# Patient Record
Sex: MALE | Race: WHITE | NOT HISPANIC OR LATINO | Employment: OTHER | URBAN - METROPOLITAN AREA
[De-identification: names, ages, dates, MRNs, and addresses within clinical notes are randomized per-mention and may not be internally consistent; named-entity substitution may affect disease eponyms.]

---

## 2021-04-08 DIAGNOSIS — Z23 ENCOUNTER FOR IMMUNIZATION: ICD-10-CM

## 2023-03-06 ENCOUNTER — OFFICE VISIT (OUTPATIENT)
Dept: DERMATOLOGY | Facility: CLINIC | Age: 78
End: 2023-03-06

## 2023-03-06 VITALS — HEIGHT: 69 IN | WEIGHT: 214 LBS | BODY MASS INDEX: 31.7 KG/M2

## 2023-03-06 DIAGNOSIS — L30.9 DERMATITIS: ICD-10-CM

## 2023-03-06 DIAGNOSIS — D48.5 NEOPLASM OF UNCERTAIN BEHAVIOR OF SKIN: Primary | ICD-10-CM

## 2023-03-06 RX ORDER — PRAVASTATIN SODIUM 40 MG
40 TABLET ORAL DAILY
COMMUNITY

## 2023-03-06 RX ORDER — HYDROCHLOROTHIAZIDE 25 MG/1
25 TABLET ORAL DAILY
COMMUNITY
Start: 2023-02-28

## 2023-03-06 RX ORDER — AMLODIPINE, VALSARTAN AND HYDROCHLOROTHIAZIDE 5; 160; 12.5 MG/1; MG/1; MG/1
12.5 TABLET ORAL DAILY
COMMUNITY

## 2023-03-06 RX ORDER — BETAMETHASONE DIPROPIONATE 0.5 MG/G
1 CREAM TOPICAL 2 TIMES DAILY
COMMUNITY

## 2023-03-06 RX ORDER — TRIAMCINOLONE ACETONIDE 1 MG/G
1 CREAM TOPICAL 2 TIMES DAILY
COMMUNITY
Start: 2023-02-27

## 2023-03-06 RX ORDER — MONTELUKAST SODIUM 10 MG/1
10 TABLET ORAL DAILY
COMMUNITY

## 2023-03-06 RX ORDER — CLOBETASOL PROPIONATE 0.5 MG/G
CREAM TOPICAL
Qty: 60 G | Refills: 3 | Status: SHIPPED | OUTPATIENT
Start: 2023-03-06

## 2023-03-06 NOTE — PATIENT INSTRUCTIONS
INFORMED CONSENT DISCUSSION AND POST-OPERATIVE INSTRUCTIONS FOR PATIENT    I   RATIONALE FOR PROCEDURE  I understand that a skin biopsy allows the Dermatologist to test a lesion or rash under the microscope to obtain a diagnosis  It usually involves numbing the area with numbing medication and removing a small piece of skin; sometimes the area will be closed with sutures  In this specific procedure, sutures are not usually needed  If any sutures are placed, then they are usually need to be removed in 2 weeks or less  I understand that my Dermatologist recommends that a skin "shave" biopsy be performed today  A local anesthetic, similar to the kind that a dentist uses when filling a cavity, will be injected with a very small needle into the skin area to be sampled  The injected skin and tissue underneath "will go to sleep” and become numb so no pain should be felt afterwards  An instrument shaped like a tiny "razor blade" (shave biopsy instrument) will be used to cut a small piece of tissue and skin from the area so that a sample of tissue can be taken and examined more closely under the microscope  A slight amount of bleeding will occur, but it will be stopped with direct pressure and a pressure bandage and any other appropriate methods  I understands that a scar will form where the wound was created  Surgical ointment will be applied to help protect the wound  Sutures are not usually needed      II   RISKS AND POTENTIAL COMPLICATIONS   I understand the risks and potential complications of a skin biopsy include but are not limited to the following:  Bleeding  Infection  Pain  Scar/keloid  Skin discoloration  Incomplete Removal  Recurrence  Nerve Damage/Numbness/Loss of Function  Allergic Reaction to Anesthesia  Biopsies are diagnostic procedures and based on findings additional treatment or evaluation may be required  Loss or destruction of specimen resulting in no additional findings    My Dermatologist has explained to me the nature of the condition, the nature of the procedure, and the benefits to be reasonably expected compared with alternative approaches  My Dermatologist has discussed the likelihood of major risks or complications of this procedure including the specific risks listed above, such as bleeding, infection, and scarring/keloid  I understand that a scar is expected after this procedure  I understand that my physician cannot predict if the scar will form a "keloid," which extends beyond the borders of the wound that is created  A keloid is a thick, painful, and bumpy scar  A keloid can be difficult to treat, as it does not always respond well to therapy, which includes injecting cortisone directly into the keloid every few weeks  While this usually reduces the pain and size of the scar, it does not eliminate it  I understand that photographs may be taken before and after the procedure  These will be maintained as part of the medical providers confidential records and may not be made available to me  I further authorize the medical provider to use the photographs for teaching purposes or to illustrate scientific papers, books, or lectures if in his/her judgment, medical research, education, or science may benefit from its use  I have had an opportunity to fully inquire about the risks and benefits of this procedure and its alternatives  I have been given ample time and opportunity to ask questions and to seek a second opinion if I wished to do so  I acknowledge that there have specifically been no guarantees as to the cosmetic results from the procedure  I am aware that with any procedure there is always the possibility of an unexpected complication  III  POST-PROCEDURAL CARE (WHAT YOU WILL NEED TO DO "AFTER THE BIOPSY" TO OPTIMIZE HEALING)    Keep the area clean and dry  Try NOT to remove the bandage or get it wet for the first 24 hours      Gently clean the area and apply surgical ointment (such as Vaseline petrolatum ointment, which is available "over the counter" and not a prescription) to the biopsy site for up to 2 weeks straight  This acts to protect the wound from the outside world  Sutures are not usually placed in this procedure  If any sutures were placed, return for suture removal as instructed (generally 1 week for the face, 2 weeks for the body)  Take Acetaminophen (Tylenol) for discomfort, if no contraindications  Ibuprofen or aspirin could make bleeding worse  Call our office immediately for signs of infection: fever, chills, increased redness, warmth, tenderness, discomfort/pain, or pus or foul smell coming from the wound  WHAT TO DO IF THERE IS ANY BLEEDING? If a small amount of bleeding is noticed, place a clean cloth over the area and apply firm pressure for ten minutes  Check the wound after 10 minutes of direct pressure  If bleeding persists, try one more time for an additional 10 minutes of direct pressure on the area  If the bleeding becomes heavier or does not stop after the second attempt, or if you have any other questions about this procedure, then please call your [facility] [address]'s Dermatologist by calling [phone] (SKIN)  I hereby acknowledge that I have reviewed and verified the site with my Dermatologist and have requested and authorized my Dermatologist to proceed with the procedure

## 2023-03-06 NOTE — PROGRESS NOTES
[facility] Note     Patient Name: Black Lombardo  Encounter Date: 03/06/2023     Have you been cared for by a [place] [id number] Dermatologist in the last 3 years and, if so, which description applies to you? NO  I am considered a "new" patient and must complete all patient intake questions  I am MALE/not capable of bearing children  REVIEW OF SYSTEMS:  Have you recently had or currently have any of the following? · Recent fever or chills? No  · Any non-healing wound? No   PAST MEDICAL HISTORY:  Have you personally ever had or currently have any of the following? If "YES," then please provide more detail  · Skin cancer (such as Melanoma, Basal Cell Carcinoma, Squamous Cell Carcinoma? No  · Tuberculosis, HIV/AIDS, Hepatitis B or C: No  · Systemic Immunosuppression such as Diabetes, Biologic or Immunotherapy, Chemotherapy, Organ Transplantation, Bone Marrow Transplantation No  · Radiation Treatment No   FAMILY HISTORY:  Any "first degree relatives" (parent, brother, sister, or child) with the following? • Skin Cancer, Pancreatic or Other Cancer? No   PATIENT EXPERIENCE:    • Do you want the Dermatologist to perform a COMPLETE skin exam today including a clinical examination under the "bra and underwear" areas? NO  • If necessary, do we have your permission to call and leave a detailed message on your Preferred Phone number that includes your specific medical information? Yes      Allergies   Allergen Reactions   • Penicillins Anaphylaxis, Hives and Shortness Of Breath      Current Outpatient Medications:   •  amLODIPine-Valsartan-HCTZ 5-160-12 5 MG TABS, Take 12 5 mg by mouth in the morning, Disp: , Rfl:   •  betamethasone dipropionate (DIPROSONE) 0 05 % cream, Apply 1 application   topically 2 (two) times a day, Disp: , Rfl:   •  hydrochlorothiazide (HYDRODIURIL) 25 mg tablet, Take 25 mg by mouth in the morning, Disp: , Rfl:   •  montelukast (SINGULAIR) 10 mg tablet, Take 10 mg by mouth in the morning, Disp: , Rfl:   •  pravastatin (PRAVACHOL) 40 mg tablet, Take 40 mg by mouth in the morning, Disp: , Rfl:   •  triamcinolone (KENALOG) 0 1 % cream, Apply 1 application  topically 2 (two) times a day (Patient not taking: Reported on 3/6/2023), Disp: , Rfl:           • Whom besides the patient is providing clinical information about today's encounter?   o NO ADDITIONAL HISTORIAN (patient alone provided history)    Physical Exam and Assessment/Plan by Diagnosis:    DERMATITIS    Physical Exam:  • (Anatomic Location); (Size and Morphological Description); (Differential Diagnosis):  o Extensive excoriations with erythematous scaly papules and plaques on back, arms, and legs  o Specimen A: Right upper back: 2 cm erythematous scaly plaque with peripheral pigment  Differential diagnosis: pigmented BCC, eczematous dermatitis, drug eruption, lichen planus, other  o Specimen B: Right anterior shin: scaly erythematous plaque Differential diagnosis: pigmented BCC, eczematous dermatitis, drug eruption, lichen planus, other  • Pertinent Positives:  • Pertinent Negatives: Additional History of Present Condition:  Pt has had a rash on different areas of the body for the past 4-5 years  Pt reports itching and scratching  Pt states he has been using Betamethasone dipropionate cream with minimal relief  Assessment and Plan: Unclear etiology with broad differential as above  Back lesion could be BCC, so sampled that as well  Will switch to clobetasol BID until biopsy results return  • I have discussed with the patient that a sample of skin via a "skin biopsy” would be potentially helpful to further make a specific diagnosis under the microscope    • Based on a thorough discussion of this condition and the management approach to it (including a comprehensive discussion of the known risks, side effects and potential benefits of treatment), the patient (family) agrees to implement the following specific plan:    o Procedure:  Skin Biopsy  After a thorough discussion of treatment options and risk/benefits/alternatives (including but not limited to local pain, scarring, dyspigmentation, blistering, possible superinfection, and inability to confirm a diagnosis via histopathology), verbal and written consent were obtained and portion of the rash was biopsied for tissue sample  See below for consent that was obtained from patient and subsequent Procedure Note   o Will send Clobetasol cream through [facility]    PROCEDURE NOTE:  PUNCH BIOPSY      Performing Physician: Tristen Camilo    Anatomic Location; Clinical Description with size (cm); Pre-Op Diagnosis:   • Specimen B: Right anterior shin: scaly erythematous plaque Differential diagnosis: pigmented BCC, eczematous dermatitis, drug eruption, lichen planus, other       Anesthesia: 1% xylocaine with epi       Topical anesthesia: None       Indications: To indicate diagnosis and management plan  Procedure Details     Patient informed of the risks (including bleeding,scaring and infection) and benefits of the procedure explained  Verbal and written informed consent obtained  The area was prepped and draped in the usual fashion  Anesthesia was obtained with 1% lidocaine with epinephrine  The skin was then stretched perpendicular to the skin tension lines and a punch biopsy to an appropriate sampling depth was obtained with a 4 mm punch with a forceps and iris scissors  Hemostasis was obtained with 4-0 Prolene x 1 sutures  Complications:  None      Specimen has been sent for review by Dermatopathology  Plan:  1  Instructed to keep the wound dry and covered for 24-48h and clean thereafter  2  Warning signs of infection were reviewed  3  Recommended that the patient use acetaminophen as needed for pain  4   Sutures if any should be removed in 10 days      Standard post-procedure care has been explained and has been included in written form within the patient's copy of Informed Consent  PROCEDURE TANGENTIAL (SHAVE) BIOPSY NOTE:    • Performing Physician: Ila Tolbert  • Anatomic Location; Clinical Description with size (cm); Pre-Op Diagnosis:   Specimen A: Right upper back: 2 cm erythematous scaly plaque with peripheral pigment  Differential diagnosis: pigmented BCC, eczematous dermatitis, drug eruption, lichen planus, other  • Post-op diagnosis: Same     • Local anesthesia: 1% xylocaine with epi      • Topical anesthesia: None    • Hemostasis: Aluminum chloride       After obtaining informed consent  at which time there was a discussion about the purpose of biopsy  and low risks of infection and bleeding  The area was prepped and draped in the usual fashion  Anesthesia was obtained with 1% lidocaine with epinephrine  A shave biopsy to an appropriate sampling depth was obtained by Shave (Dermablade or 15 blade) The resulting wound was covered with surgical ointment and bandaged appropriately  The patient tolerated the procedure well without complications and was without signs of functional compromise  Specimen has been sent for review by Dermatopathology  Standard post-procedure care has been explained and has been included in written form within the patient's copy of Informed Consent  INFORMED CONSENT DISCUSSION AND POST-OPERATIVE INSTRUCTIONS FOR PATIENT    I   RATIONALE FOR PROCEDURE  I understand that a skin biopsy allows the Dermatologist to test a lesion or rash under the microscope to obtain a diagnosis  It usually involves numbing the area with numbing medication and removing a small piece of skin; sometimes the area will be closed with sutures  In this specific procedure, sutures are not usually needed  If any sutures are placed, then they are usually need to be removed in 2 weeks or less  I understand that my Dermatologist recommends that a skin "shave" biopsy be performed today    A local anesthetic, similar to the kind that a dentist uses when filling a cavity, will be injected with a very small needle into the skin area to be sampled  The injected skin and tissue underneath "will go to sleep” and become numb so no pain should be felt afterwards  An instrument shaped like a tiny "razor blade" (shave biopsy instrument) will be used to cut a small piece of tissue and skin from the area so that a sample of tissue can be taken and examined more closely under the microscope  A slight amount of bleeding will occur, but it will be stopped with direct pressure and a pressure bandage and any other appropriate methods  I understands that a scar will form where the wound was created  Surgical ointment will be applied to help protect the wound  Sutures are not usually needed  II   RISKS AND POTENTIAL COMPLICATIONS   I understand the risks and potential complications of a skin biopsy include but are not limited to the following:  • Bleeding  • Infection  • Pain  • Scar/keloid  • Skin discoloration  • Incomplete Removal  • Recurrence  • Nerve Damage/Numbness/Loss of Function  • Allergic Reaction to Anesthesia  • Biopsies are diagnostic procedures and based on findings additional treatment or evaluation may be required  • Loss or destruction of specimen resulting in no additional findings    My Dermatologist has explained to me the nature of the condition, the nature of the procedure, and the benefits to be reasonably expected compared with alternative approaches  My Dermatologist has discussed the likelihood of major risks or complications of this procedure including the specific risks listed above, such as bleeding, infection, and scarring/keloid  I understand that a scar is expected after this procedure  I understand that my physician cannot predict if the scar will form a "keloid," which extends beyond the borders of the wound that is created  A keloid is a thick, painful, and bumpy scar    A keloid can be difficult to treat, as it does not always respond well to therapy, which includes injecting cortisone directly into the keloid every few weeks  While this usually reduces the pain and size of the scar, it does not eliminate it  I understand that photographs may be taken before and after the procedure  These will be maintained as part of the medical providers confidential records and may not be made available to me  I further authorize the medical provider to use the photographs for teaching purposes or to illustrate scientific papers, books, or lectures if in his/her judgment, medical research, education, or science may benefit from its use  I have had an opportunity to fully inquire about the risks and benefits of this procedure and its alternatives  I have been given ample time and opportunity to ask questions and to seek a second opinion if I wished to do so  I acknowledge that there have specifically been no guarantees as to the cosmetic results from the procedure  I am aware that with any procedure there is always the possibility of an unexpected complication  III  POST-PROCEDURAL CARE (WHAT YOU WILL NEED TO DO "AFTER THE BIOPSY" TO OPTIMIZE HEALING)    • Keep the area clean and dry  Try NOT to remove the bandage or get it wet for the first 24 hours  • Gently clean the area and apply surgical ointment (such as Vaseline petrolatum ointment, which is available "over the counter" and not a prescription) to the biopsy site for up to 2 weeks straight  This acts to protect the wound from the outside world  • Sutures are not usually placed in this procedure  If any sutures were placed, return for suture removal as instructed (generally 1 week for the face, 2 weeks for the body)  • Take Acetaminophen (Tylenol) for discomfort, if no contraindications  Ibuprofen or aspirin could make bleeding worse      • Call our office immediately for signs of infection: fever, chills, increased redness, warmth, tenderness, discomfort/pain, or pus or foul smell coming from the wound  WHAT TO DO IF THERE IS ANY BLEEDING? If a small amount of bleeding is noticed, place a clean cloth over the area and apply firm pressure for ten minutes  Check the wound after 10 minutes of direct pressure  If bleeding persists, try one more time for an additional 10 minutes of direct pressure on the area  If the bleeding becomes heavier or does not stop after the second attempt, or if you have any other questions about this procedure, then please call your [facility] Dermatologist by calling [phone] (SKIN)  I hereby acknowledge that I have reviewed and verified the site with my Dermatologist and have requested and authorized my Dermatologist to proceed with the procedure      Scribe Attestation    I,:  Reyes Lowe am acting as a scribe while in the presence of the attending physician :       I,:  Kapil Mcleod MD personally performed the services described in this documentation    as scribed in my presence :

## 2023-03-16 NOTE — RESULT ENCOUNTER NOTE
Team- please schedule 30 minute excision for the [facility] on the R upper back    DERMATOPATHOLOGY RESULT NOTE    Results reviewed by ordering physician  Called patient to personally discuss results  Discussed results with patient  Instructions for Clinical Derm Team:   (remember to route Result Note to appropriate staff):    Call patient and schedule for excision of site A    Result & Plan by Specimen:    Specimen A: malignant BCC  Plan: excision      Specimen B: hypersensitivity reaction  Plan: Favor drug related (possible his combo amlodipine/HCTZ/valsartan vs HCTZ vs statin)  Advised to see PCP to consider stopping the combo med first for 6-8 weeks to see if this is etiology  Will contact PCP to inform him as well- Dr Rach Russ  Patient to continue topical clobetasol in the interim  Component   Case Report  Surgical Pathology Report                         Case: [id number]                                   Authorizing Provider: Aubree Aguilar MD     Collected:           03/06/2023 [facility]              Ordering Location:     [facility]      Received:            03/06/2023 [address] [place] [address]                                                                 Pathologist:           Aubree Aguilar MD                                                       Specimens:   A) - Skin, Other, Specimen A: Right upper back                                                      B) - Skin, Other, Specimen B: Right anterior shin                                        Final Diagnosis  A  Skin, Right upper back, Shave biopsy:  BASAL CELL CARCINOMA, SUPERFICIAL AND MULTIFOCAL TYPE; EXTENDING TO BIOPSY MARGINS  (See note)     Note: A GMS highlights normal Pityrosporum within a follicle, but is otherwise negative for fungal elements         B  Skin, Right anterior shin, Punch biopsy:  Features most consistent with DERMAL HYPERSENSITIVITY REACTION   (See note)     Note: Sections demonstrate a primarily normal epidermis with a normal basket weave stratum corneum and without significant spongiosis or epidermotropism/exocytosis  In the superficial to mid dermis, prominent perivascular and interstitial inflammation is present with extensive eosinophils, as well as admixed chronic inflammation  Vessel walls appear intact  The eosinophils are present in the interstitium extending to the mid to deep portions of the dermis  A hypersensitivity reaction is favored      A GMS is negative for fungal elements      This case was reviewed in intradepartmental consensus conference with agreement    Electronically signed by Thelma Pavon MD on 3/15/2023 at  1:43 PM   Preliminary result electronically signed by Thelma Pavon MD on 3/13/2023 at  9:45 AM   Preliminary result electronically signed by Thelma Pavon MD on 3/10/2023 at  9:19 AM  Additional Information

## 2023-03-17 ENCOUNTER — TELEPHONE (OUTPATIENT)
Dept: DERMATOLOGY | Facility: CLINIC | Age: 78
End: 2023-03-17

## 2023-03-17 NOTE — TELEPHONE ENCOUNTER
Per Dr Jeimy Cole request      Team- please schedule 30 minute excision for the [facility] on the R upper back     Dr Jeimy Cole first available slot is in June for a procedure    Can this wait until then or should this be scheduled with someone else  If so, with who and when? Pt lives in [facility]  Thank you!     Sent message to Dr Shameka Jackson and CV clinical

## 2023-05-18 ENCOUNTER — PROCEDURE VISIT (OUTPATIENT)
Dept: DERMATOLOGY | Facility: CLINIC | Age: 78
End: 2023-05-18

## 2023-05-18 VITALS — BODY MASS INDEX: 31.25 KG/M2 | TEMPERATURE: 96.8 F | WEIGHT: 211 LBS | HEIGHT: 69 IN

## 2023-05-18 DIAGNOSIS — C44.519 BASAL CELL CARCINOMA (BCC) OF BACK: Primary | ICD-10-CM

## 2023-05-18 RX ORDER — AMLODIPINE BESYLATE 5 MG/1
5 TABLET ORAL DAILY
COMMUNITY
Start: 2023-04-19

## 2023-05-18 NOTE — PROGRESS NOTES
"[facility] Note     Patient Name: Sammi Alberts  Encounter Date: 05/18/2023     Have you been cared for by a [facility] in the last 3 years and, if so, which description applies to you? Yes  I have been here within the last 3 years, and my medical history has NOT changed since that time  I am MALE/not capable of bearing children  REVIEW OF SYSTEMS:  Have you recently had or currently have any of the following? · No changes in my recent health  PAST MEDICAL HISTORY:  Have you personally ever had or currently have any of the following? If \"YES,\" then please provide more detail  · No changes in my medical history  FAMILY HISTORY:  Any \"first degree relatives\" (parent, brother, sister, or child) with the following? • No changes in my family's known health  PATIENT EXPERIENCE:    • Do you want the Dermatologist to perform a COMPLETE skin exam today including a clinical examination under the \"bra and underwear\" areas? NO  • If necessary, do we have your permission to call and leave a detailed message on your Preferred Phone number that includes your specific medical information? Yes      Allergies   Allergen Reactions   • Penicillins Anaphylaxis, Hives and Shortness Of Breath      Current Outpatient Medications:   •  amLODIPine (NORVASC) 5 mg tablet, Take 5 mg by mouth daily, Disp: , Rfl:   •  amLODIPine-Valsartan-HCTZ 5-160-12 5 MG TABS, Take 12 5 mg by mouth in the morning, Disp: , Rfl:   •  betamethasone dipropionate (DIPROSONE) 0 05 % cream, Apply 1 application  topically 2 (two) times a day, Disp: , Rfl:   •  clobetasol (TEMOVATE) 0 05 % cream, Apply BID for up to 2 weeks to affected skin on neck down prn flares then take one week break and can repeat regimen prn flares   Do not apply to groin, skin folds, face , Disp: 60 g, Rfl: 3  •  hydrochlorothiazide (HYDRODIURIL) 25 mg tablet, Take 25 mg by mouth in the morning, Disp: , Rfl:   •  montelukast (SINGULAIR) 10 " mg tablet, Take 10 mg by mouth in the morning, Disp: , Rfl:   •  pravastatin (PRAVACHOL) 40 mg tablet, Take 40 mg by mouth in the morning, Disp: , Rfl:   •  triamcinolone (KENALOG) 0 1 % cream, Apply 1 application  topically 2 (two) times a day (Patient not taking: Reported on 3/6/2023), Disp: , Rfl:           • Whom besides the patient is providing clinical information about today's encounter?   o NO ADDITIONAL HISTORIAN (patient alone provided history)    Physical Exam and Assessment/Plan by Diagnosis:    CURETTAGE AND DESICCATION Malignant Lesion    Diagnosis: Basal celll carcinoma, superficial and multifocal type  • Prior biopsy: Yes  • Access Number: [id number]  • Location Right upper back  • Size preop 2 4 x 1 8 cm  • Size postop 2 9 x 2 5 cm    Component    Case Report   Surgical Pathology Report                         Case: [id number]                                    Authorizing Provider: Shavon Man MD     Collected:           03/06/2023 [facility]               Ordering Location:     [facility]      Received:            03/06/2023 [address] [place] [address]                                                                  Pathologist:           Shavon Man MD                                                        Specimens:   A) - Skin, Other, Specimen A: Right upper back                                                       B) - Skin, Other, Specimen B: Right anterior shin                                          Final Diagnosis   A  Skin, Right upper back, Shave biopsy:  BASAL CELL CARCINOMA, SUPERFICIAL AND MULTIFOCAL TYPE; EXTENDING TO BIOPSY MARGINS  (See note)     Note: A GMS highlights normal Pityrosporum within a follicle, but is otherwise negative for fungal elements             Additional History of Present Condition:  Biopsy done 03/06/2023  Discussed options of ED&C vs excision   Patient requested to proceed with [facility] with signed consent after discussion of slightly lower permanent cure rate and inability to send tissue for margin evaluation vs standard excision  Assessment and Plan:  Based on a thorough discussion of this condition and the management approach to it (including a comprehensive discussion of the known risks, side effects and potential benefits of treatment), the patient (family) agrees to treat the above described lesion with desiccation and curettage  Procedure:  • The area was cleanly  prepped in usual manner  • Anesthesia:1% xylocaine with epi    • The above described lesion was aggressively curetted to mid dermis followed by desiccation  • Number of cycles of desiccation and curettage 3; proceeded until all tissue felt gritty/healthy   • A clean dry dressing was placed on site  Oral and written postop care instructions were discussed and reviewed      DISCUSSION OF TREATMENT AND POSTOP CARE FOR PATIENT    What is curettage and desiccation? Curettage and desiccation is a type of electrosurgery in which a skin lesion is scraped off and heat is applied to the skin surface  What is involved in curettage and cautery? Your doctor will explain to you why your skin lesion needs treatment and the procedure involved  You may have to sign a consent form to indicate that you consent to the surgical procedure  Tell your doctor if you are taking any medication, or if you have any allergies or medical conditions  The doctor will inject some local anaesthetic into the area surrounding the lesion to be treated  This will make the skin go numb so no pain should be felt during the procedure  You may feel a pushing sensation but this should not be painful  The skin lesion is scraped off with a curette, which is like a small spoon with very sharp edges  The lesion should be sent to a pathology laboratory for analysis  The wound surface is then cauterised with a hot wire beaded tip or electrosurgical unit (diathermy)   This stops bleeding and helps destroys any remaining skin tumour cells  This procedure is usually repeated twice for malignant skin lesions (serial curettage and cautery)  A dressing may be applied and instructions should be given on how to care for your wound  What types of skin lesions can be treated by curettage? Curettage is suitable to treat lesions where the material being scraped off is softer than the surrounding skin or when there is a natural cleavage plane between the lesion and the surrounding normal tissue  The following are sometimes treated by curettage:  • Squamous cell carcinoma in situ   • Actinic keratoses   Basal cell carcinomas that are large, deep or recurrent are usually not suitable for curettage  Lesions with poorly defined edges are also generally unsuitable  Curettage and desiccation is most often used in more superficial type basal cell carcinoma  Will I have a scar? Curettage often results in some sort of scar especially if accompanied by cautery  The scars from curettage are usually flat and round  They are a similar size to that of the original skin lesion  Some people have an abnormal response to skin healing and these people may get larger scars than usual (keloids and hypertrophic scarring)  How do I look after the wound following skin curettage? The wound may be tender when the local anaesthetic wears off  • Leave the dressing in place till the nest day  Avoid strenuous exertion and stretching of the area  • If there is any bleeding, press on the wound firmly with a folded towel without looking at it for 30 minutes  If it is still bleeding after this time, seek medical attention  Follow instructions a written in our consent ,  The wound from curettage will take approximately 2-3 weeks to heal over  The scar will initially be red and raised but usually reduces in colour and size over several months            Scribe Attestation    I,:  Madison Peterson am acting as a scribe while in the presence of the attending physician :       I,:  Gian Jang MD personally performed the services described in this documentation    as scribed in my presence :

## 2023-09-28 ENCOUNTER — TELEPHONE (OUTPATIENT)
Age: 78
End: 2023-09-28

## 2023-09-28 NOTE — TELEPHONE ENCOUNTER
Patient calling stating he is having issues with his sx site and is getting worried, nothing available in [place].  Is this something that should be overbooked or just waitlist?

## 2023-09-28 NOTE — TELEPHONE ENCOUNTER
Called patient , no answer , left vm requesting a call back   .  Advised him to send us a picture via my chart

## 2023-10-02 ENCOUNTER — TELEPHONE (OUTPATIENT)
Age: 78
End: 2023-10-02

## 2023-10-02 NOTE — TELEPHONE ENCOUNTER
Good morning Dr. Claude Maza,   Patient states soreness , tenderness and raise in the middle of his sx site ; started about a week ago . Denies  fever , chills or drainage . Advised patient to send us a picture via my chart , he will call us back to teach him how to do it as soon as he has his computer on. I gave him my number and our main number as well.

## 2023-10-03 ENCOUNTER — TELEPHONE (OUTPATIENT)
Age: 78
End: 2023-10-03

## 2023-10-03 NOTE — TELEPHONE ENCOUNTER
Rec'd call from patient  Please schedule for  10/16/2023 @ 11:40 in [facility] with Dr. Haven Krabbe morning! Patient can be added as an overbook at 1140 in [facility] as long as we don't have anyone after 1120. No rush- sometime in the next month is fine. Thanks!

## 2023-10-16 ENCOUNTER — TELEPHONE (OUTPATIENT)
Dept: OTHER | Facility: HOSPITAL | Age: 78
End: 2023-10-16

## 2023-10-16 ENCOUNTER — OFFICE VISIT (OUTPATIENT)
Dept: DERMATOLOGY | Facility: CLINIC | Age: 78
End: 2023-10-16
Payer: COMMERCIAL

## 2023-10-16 VITALS — HEIGHT: 69 IN | BODY MASS INDEX: 29.77 KG/M2 | WEIGHT: 201 LBS

## 2023-10-16 DIAGNOSIS — L91.0 KELOID: Primary | ICD-10-CM

## 2023-10-16 DIAGNOSIS — L30.9 DERMATITIS: ICD-10-CM

## 2023-10-16 PROCEDURE — 11900 INJECT SKIN LESIONS </W 7: CPT | Performed by: DERMATOLOGY

## 2023-10-16 PROCEDURE — 99214 OFFICE O/P EST MOD 30 MIN: CPT | Performed by: DERMATOLOGY

## 2023-10-16 RX ORDER — BETAMETHASONE DIPROPIONATE 0.5 MG/G
CREAM TOPICAL
Qty: 45 G | Refills: 0 | Status: SHIPPED | OUTPATIENT
Start: 2023-10-16

## 2023-10-16 RX ORDER — TRIAMCINOLONE ACETONIDE 40 MG/ML
40 INJECTION, SUSPENSION INTRA-ARTICULAR; INTRAMUSCULAR ONCE
Status: COMPLETED | OUTPATIENT
Start: 2023-10-16 | End: 2023-10-16

## 2023-10-16 RX ORDER — BETAMETHASONE DIPROPIONATE 0.5 MG/G
CREAM TOPICAL
Qty: 45 G | Refills: 10 | Status: SHIPPED | OUTPATIENT
Start: 2023-10-16 | End: 2023-10-16 | Stop reason: SDUPTHER

## 2023-10-16 RX ADMIN — TRIAMCINOLONE ACETONIDE 40 MG: 40 INJECTION, SUSPENSION INTRA-ARTICULAR; INTRAMUSCULAR at 12:02

## 2023-10-16 NOTE — PROGRESS NOTES
[facility] Note     Patient Name: Bronson Joe  Encounter Date: 10/16/2023     Have you been cared for by a [facility] in the last 3 years and, if so, which description applies to you? Yes. I have been here within the last 3 years, and my medical history has NOT changed since that time. I am MALE/not capable of bearing children. REVIEW OF SYSTEMS:  Have you recently had or currently have any of the following? No changes in my recent health. PAST MEDICAL HISTORY:  Have you personally ever had or currently have any of the following? If "YES," then please provide more detail. No changes in my medical history. FAMILY HISTORY:  Any "first degree relatives" (parent, brother, sister, or child) with the following? No changes in my family's known health. PATIENT EXPERIENCE:    Do you want the Dermatologist to perform a COMPLETE skin exam today including a clinical examination under the "bra and underwear" areas? NO  If necessary, do we have your permission to call and leave a detailed message on your Preferred Phone number that includes your specific medical information? Yes      Allergies   Allergen Reactions    Penicillins Anaphylaxis, Hives and Shortness Of Breath      Current Outpatient Medications:     amLODIPine (NORVASC) 5 mg tablet, Take 5 mg by mouth daily, Disp: , Rfl:     betamethasone dipropionate (DIPROSONE) 0.05 % cream, Apply 1 application. topically 2 (two) times a day, Disp: , Rfl:     clobetasol (TEMOVATE) 0.05 % cream, Apply BID for up to 2 weeks to affected skin on neck down prn flares then take one week break and can repeat regimen prn flares.  Do not apply to groin, skin folds, face., Disp: 60 g, Rfl: 3    hydrochlorothiazide (HYDRODIURIL) 25 mg tablet, Take 25 mg by mouth in the morning, Disp: , Rfl:     montelukast (SINGULAIR) 10 mg tablet, Take 10 mg by mouth in the morning, Disp: , Rfl:     pravastatin (PRAVACHOL) 40 mg tablet, Take 40 mg by mouth in the morning, Disp: , Rfl:     amLODIPine-Valsartan-HCTZ 5-160-12.5 MG TABS, Take 12.5 mg by mouth in the morning, Disp: , Rfl:     triamcinolone (KENALOG) 0.1 % cream, Apply 1 application. topically 2 (two) times a day (Patient not taking: Reported on 3/6/2023), Disp: , Rfl:           Whom besides the patient is providing clinical information about today's encounter? NO ADDITIONAL HISTORIAN (patient alone provided history)    Physical Exam and Assessment/Plan by Diagnosis:      History of Basal Cell Carcinoma with Hypertrophic Scar at [facility]    Diagnosis: Basal celll carcinoma, superficial and multifocal type  Prior biopsy: Yes  Access Number: [id number]  Location Right upper back  Size preop 2.4 x 1.8 cm  Size postop 2.9 x 2.5 cm     Component     Case Report   Surgical Pathology Report                         Case: [id number]                                    Authorizing Provider: Shy Mera MD     Collected:           03/06/2023 [facility]               Ordering Location:     [facility]      Received:            03/06/2023 [address] [place] [address]                                                                  Pathologist:           Shy Mera MD                                                        Specimens:   A) - Skin, Other, Specimen A: Right upper back                                                       B) - Skin, Other, Specimen B: Right anterior shin                                           Final Diagnosis   A. Skin, Right upper back, Shave biopsy:  BASAL CELL CARCINOMA, SUPERFICIAL AND MULTIFOCAL TYPE; EXTENDING TO BIOPSY MARGINS. (See note)     Note: A GMS highlights normal Pityrosporum within a follicle, but is otherwise negative for fungal elements. Additional History of Present Condition:  Biopsy done 03/06/2023. Discussed options of ED&C vs excision.  Patient requested to proceed with [facility] with signed consent after discussion of slightly lower permanent cure rate and inability to send tissue for margin evaluation vs standard excision. Site had been healing well, but noticed thickening at the site about a month ago with some discomfort. Physical Exam:  Anatomic Location Affected:  Right upper back  Morphological Description:  Hypertrophic pink firm scar within round ED&C scar. No evidence of recurrent BCC  Pertinent Positives:  Pertinent Negatives: Additional History of Present Condition:  Present for one month     Assessment and Plan:  Based on a thorough discussion of this condition and the management approach to it (including a comprehensive discussion of the known risks, side effects and potential benefits of treatment), the patient (family) agrees to implement the following specific plan:  Kenalog injection done in office today   Consent obtained  Send Piq[facility] update in one month with progress    A keloid scar is a firm, smooth, hard growth due to spontaneous scar formation. It can arise soon after an injury, or develop months later. Keloids may be uncomfortable or itchy and extend well beyond the original wound. They may form on any part of the body, although the upper chest and shoulders are especially prone to them. The precise reason that wound healing sometimes leads to keloid formation is under investigation but is not yet clear. While most people never form keloids, others develop them after minor injuries, burns, insect bites and acne spots. Dark skinned people form keloids more easily than Caucasians. A keloid is harmless to general health and does not change into skin cancer. The following measures are helpful in at least some patients.   Emollients (creams and oils)  Polyurethane or silicone scar reduction patches  Silicone gel  Oral or topical tranilast (an inhibitor of collagen synthesis)  Pressure dressings  Surgical excision (but in keloids, excision may result in a new keloid even larger than the original one)  Intralesional corticosteroid injection, repeated every few weeks  Intralesional 5-fluorouracil  Cryotherapy  Superficial X-ray treatment soon after surgery. Pulsed dye laser   Skin needling  Subcision    Scar dressings should be worn for 12-24 hours per day, for at least 8 to 12 weeks, and perhaps for much longer. PROCEDURE:  INTRALESIONAL STEROID INJECTION (KENALOG INJECTION)    Purpose: Triamcinolone is a synthetic glucocorticoid corticosteroid that has marked anti-inflammatory action. It is prepared in sterile aqueous suspension suitable for injecting directly into a lesion on or immediately below the skin to treat a dermal inflammatory process. Indications: It is indicated for alopecia areata; inflammatory acne cysts; discoid lupus erythematosus; keloids and hypertrophic scars; inflammatory lesions of granuloma annulare, lichen planus, lichen simplex chronicus (neurodermatitis), psoriatic plaques, and other localized inflammatory skin conditions. Potential Side Effects: I understand that triamcinolone injection can potentially cause early and/or delayed adverse effects such as:    Pain    Impaired wound healing    Increased hair growth    Bleeding    White or brown marks    Steroid acne    Infection    Telangiectasia    Skin thinning    Cutaneous and subcutaneous lipoatrophy (most common) appearing as skin indentations or dimples around the injection sites a few weeks after treatment     PROCEDURE NOTE:  After verbal and written consent were obtained, the to-be-treated area was wiped and cleaned with rubbing alcohol 70%. Then, a total of 0.4 mL of Kenalog CONCENTRATION:  40 mg/mL   (Lot# [id number]; Expiration 7/2024, NDC#: 0716-626-42) was injected intralesionally into a total of 1 lesion/s on the following anatomic areas:  Right upper back  using a 1-mL syringe and a 30-gauge needle. There was less than 1 mL of blood loss and little to no discomfort.   The area was bandaged with a Band-aid. The patient tolerated the procedure well and remained in the office for observation. With no signs of an adverse reaction, the patient was eventually discharged from clinic. DERMAL HYPERSENSITIVITY REACTION- BIOPSY PROVEN  Physical Exam:  Anatomic Location Affected:  trunk, arms, legs  Morphological Description:  erythematous scaling plaques  Pertinent Positives:  Pertinent Negatives: Additional History of Present Condition:  Biopsy proven. Patient notes despite stopping ARB combo, his rash continues. He is still on amlodipine and HCTZ. Has been using betamethasone cream which he finds works better for him than triamcinolone or clobetasol. 1 Result Note       1 Patient Communication      Component    Case Report   Surgical Pathology Report                         Case: [id number]                                   Authorizing Provider: Guerita Lizarraga MD     Collected:           03/06/2023 [facility]               Ordering Location:     [facility]      Received:            03/06/2023 [address] [place] [address]                                                                 Pathologist:           Guerita Lizarraga MD                                                       Specimens:   A) - Skin, Other, Specimen A: Right upper back                                                      B) - Skin, Other, Specimen B: Right anterior shin                                          Final Diagnosis   A. Skin, Right upper back, Shave biopsy:  BASAL CELL CARCINOMA, SUPERFICIAL AND MULTIFOCAL TYPE; EXTENDING TO BIOPSY MARGINS. (See note)     Note: A GMS highlights normal Pityrosporum within a follicle, but is otherwise negative for fungal elements. B. Skin, Right anterior shin, Punch biopsy:  Features most consistent with DERMAL HYPERSENSITIVITY REACTION.  (See note)     Note: Sections demonstrate a primarily normal epidermis with a normal basket weave stratum corneum and without significant spongiosis or epidermotropism/exocytosis. In the superficial to mid dermis, prominent perivascular and interstitial inflammation is present with extensive eosinophils, as well as admixed chronic inflammation. Vessel walls appear intact. The eosinophils are present in the interstitium extending to the mid to deep portions of the dermis. A hypersensitivity reaction is favored. A GMS is negative for fungal elements. This case was reviewed in intradepartmental consensus conference with agreement. Electronically signed by Kassy Casanova MD on 3/15/2023 at  1:43 PM  Preliminary result electronically signed by Kassy Casanova MD on 3/13/2023 at  9:45 AM  Preliminary result electronically signed by Kassy Casanova MD on 3/10/2023 at  9:19 AM   Additional Information    All reported additional testing was performed with appropriately reactive controls. These tests were developed and their performance characteristics determined by [facility] or appropriate performing facility, though some tests may be performed on tissues which have not been validated for performance characteristics (such as staining performed on alcohol exposed cell blocks and decalcified tissues). Results should be interpreted with caution and in the context of the patients’ clinical condition. These tests may not be cleared or approved by the U.S. Food and Drug Administration, though the FDA has determined that such clearance or approval is not necessary. These tests are used for clinical purposes and they should not be regarded as investigational or for research. This laboratory has been approved by CLIA 88, designated as a high-complexity laboratory and is qualified to perform these tests. Mitra Steiner Description    A. The specimen is received in formalin, labeled with the patient's name and hospital number, and is designated " right upper back".   The specimen consists of a shave biopsy of tan-gray scaly skin measuring 1.6 x 0.9 x 0.1 cm. The apparent margin of resection is inked green. The specimen is quadrisected along the short axis and entirely submitted between sponges in 1 cassette. B. The specimen is received in formalin, labeled with the patient's name and hospital number, and is designated " right anterior shin". The specimen consists of a punch biopsy of tan/gray skin measuring 4 mm in diameter by 4 mm in depth. The apparent margin of resection is inked green and the epidermal surface is inked red. The specimen is bisected perpendicular to the skin surface and entirely submitted between sponges in 1 cassette. Note: The estimated total formalin fixation time based upon information provided by the submitting clinician and the standard processing schedule is under 72 hours. RRavotti   Clinical Information    Specimen A: Right upper back; Skin; Shave biopsy; 68 yo male; 2 cm erythematous scaly plaque with peripheral pigment. Differential diagnosis: pigmented BCC, eczematous dermatitis, drug eruption, lichen planus, other    Specimen B: Right anterior shin; Skin;  Shave biopsy; 68 yo male; scaly erythematous plaque Differential diagnosis: pigmented BCC, eczematous dermatitis, drug eruption, lichen planus, other    ATTEN DERM PATH; DR. Darinel Rao   Resulting Agency [facility]          Assessment and Plan:  Based on a thorough discussion of this condition and the management approach to it (including a comprehensive discussion of the known risks, side effects and potential benefits of treatment), the patient (family) agrees to implement the following specific plan:  Valsartan now ruled out  Would consider stopping HCTZ next for 6-8 weeks as next possible culprit  Will message PCP regarding this and advise to try adjusting meds so patient can stop HCTZ for at least 6-8 weeks      Scribe Attestation      I,:  Tonya Montenegro MA am acting as a scribe while in the presence of the attending physician.:       I,:  Kaity Olguin MD personally performed the services described in this documentation    as scribed in my presence.:

## 2024-02-19 ENCOUNTER — OFFICE VISIT (OUTPATIENT)
Dept: DERMATOLOGY | Facility: CLINIC | Age: 79
End: 2024-02-19

## 2024-02-19 VITALS — TEMPERATURE: 98.2 F | HEIGHT: 68 IN | BODY MASS INDEX: 27.8 KG/M2 | WEIGHT: 183.4 LBS

## 2024-02-19 DIAGNOSIS — D22.71 MULTIPLE BENIGN MELANOCYTIC NEVI OF BOTH UPPER EXTREMITIES, BOTH LOWER EXTREMITIES, AND TRUNK: ICD-10-CM

## 2024-02-19 DIAGNOSIS — D22.72 MULTIPLE BENIGN MELANOCYTIC NEVI OF BOTH UPPER EXTREMITIES, BOTH LOWER EXTREMITIES, AND TRUNK: ICD-10-CM

## 2024-02-19 DIAGNOSIS — D22.61 MULTIPLE BENIGN MELANOCYTIC NEVI OF BOTH UPPER EXTREMITIES, BOTH LOWER EXTREMITIES, AND TRUNK: ICD-10-CM

## 2024-02-19 DIAGNOSIS — Z85.828 HISTORY OF BASAL CELL CANCER: Primary | ICD-10-CM

## 2024-02-19 DIAGNOSIS — D22.5 MULTIPLE BENIGN MELANOCYTIC NEVI OF BOTH UPPER EXTREMITIES, BOTH LOWER EXTREMITIES, AND TRUNK: ICD-10-CM

## 2024-02-19 DIAGNOSIS — D22.62 MULTIPLE BENIGN MELANOCYTIC NEVI OF BOTH UPPER EXTREMITIES, BOTH LOWER EXTREMITIES, AND TRUNK: ICD-10-CM

## 2024-02-19 DIAGNOSIS — L30.9 DERMATITIS: ICD-10-CM

## 2024-02-19 DIAGNOSIS — L57.0 ACTINIC KERATOSIS: ICD-10-CM

## 2024-02-19 RX ORDER — CLOBETASOL PROPIONATE 0.5 MG/G
CREAM TOPICAL
Qty: 60 G | Refills: 4 | Status: SHIPPED | OUTPATIENT
Start: 2024-02-19 | End: 2024-02-19

## 2024-02-19 RX ORDER — BETAMETHASONE DIPROPIONATE 0.5 MG/G
CREAM TOPICAL
Qty: 45 G | Refills: 2 | Status: SHIPPED | OUTPATIENT
Start: 2024-02-19

## 2024-02-19 RX ORDER — CLOBETASOL PROPIONATE 0.5 MG/G
CREAM TOPICAL
Qty: 60 G | Refills: 4 | Status: SHIPPED | OUTPATIENT
Start: 2024-02-19 | End: 2024-02-19 | Stop reason: SDUPTHER

## 2024-02-19 NOTE — PATIENT INSTRUCTIONS
MD Notification    Notified Person:  MD    Notified Persons Name: Oralia    Notification Date/Time: 10/17/17 10:18 a.m.     Notification Interaction:  Talked with Physician    Purpose of Notification: asking for Psych consult to reassess d/c disposition recommendations.  [place] is coming out today at 13:00.    Orders Received: Psych saw the patient and we are awaiting dictation regarding disposition    Comments:     What is skin cancer?  Skin cancer is unfortunately very common. That's why we are here to help you on your journey to healthy happy skin! There are two main types of skin cancer: melanoma and non-melanoma skin cancer. Melanoma is a form of skin cancer that often arises within an existing nevus or mole. However, this is not always the case. Melanoma can arise anywhere (not only where you have moles right now). Melanoma can run in families, so letting us know about your family history is important. Non-melanoma skin cancer is the most common type of cancer in the [place]. The two main types of non-melanoma skin cancers are basal cell carcinomas (BCC) and squamous cell carcinoma (SCC). These cancers tend to be less aggressive than melanomas but are still important to look for and treat.    What can I do to prevent skin cancer?  One of the largest risk factors for skin cancer is sun exposure or UV radiation. Therefore, sun protection is faria! Here are some great tips for protecting yourself!  Try to avoid direct sun exposure during peak sun hours (10 AM to 2 PM)  Remember you get A LOT of sun even under cloud coverage and through care windows!  When choosing a sunscreen, look for one that says “broad spectrum” sunscreen. This means it protects you from more of the harmful UV rays.   Choose a sunscreen that is SPF 30 or greater for best protection.   Apply sunscreen to all sun-exposed skin and reapply every 2 hours.   Consider sun protective clothing! Great additions to your sun protective clothing wardrobe include broad brimmed hats, sunglasses, UPF clothing.  Avoid tanning salons. These have been shown to be very harmful in terms of your risk of skin cancer.   Avoid “base tans”. We now know that tans are dangerous (not just sun burns). If you want to have a tan for a trip, consider a spray tan!    Should I check my skin at home between my dermatology appointments?  Yes! It's always a great idea to look at your  skin on a regular basis. Here are some things to look for when monitoring your skin.   For melanoma, look for the ABCDE's!  A = Asymmetry. Look for a spot where one half does not match the other!  B = Borders. Look for a spot that has jagged, ragged or irregular borders.  C = Color. Look for a spot that is not evenly colored and often includes multiple colors, especially true black, red, white, blue, grey.   D = Diameter. Look for a spot that is larger than the size of a pencil eraser.  E = Evolution. If you ever have a spot that is changing in shape, color, size or symptoms (becomes itchy, painful or starts to bleed), always call us!  For non-melanoma skin cancers, look for a new, pink spot that is not going away, especially one that is itchy, painful or bleeding.     What should I do if I see a spot that is concerning for melanoma or non-melanoma skin cancer?  If you are ever concerned, call us! Do not wait for your next appointment. We want to help!

## 2024-02-19 NOTE — PROGRESS NOTES
"[facility] Note     Patient Name: Joby Gill  Encounter Date: 2/19/24     Have you been cared for by a [facility] Dermatologist in the last 3 years and, if so, which description applies to you?    Yes.  I have been here within the last 3 years, and my medical history has NOT changed since that time.  I am MALE/not capable of bearing children.    REVIEW OF SYSTEMS:  Have you recently had or currently have any of the following? No changes in my recent health.   PAST MEDICAL HISTORY:  Have you personally ever had or currently have any of the following?  If \"YES,\" then please provide more detail. No changes in my medical history.   HISTORY OF IMMUNOSUPPRESSION: Do you have a history of any of the following:  Systemic Immunosuppression such as Diabetes, Biologic or Immunotherapy, Chemotherapy, Organ Transplantation, Bone Marrow Transplantation?  No     Answering \"YES\" requires the addition of the dotphrase \"IMMUNOSUPPRESSED\" as the first diagnosis of the patient's visit.   FAMILY HISTORY:  Any \"first degree relatives\" (parent, brother, sister, or child) with the following?    No changes in my family's known health.   PATIENT EXPERIENCE:    Do you want the Dermatologist to perform a COMPLETE skin exam today including a clinical examination under the \"bra and underwear\" areas?  Yes  If necessary, do we have your permission to call and leave a detailed message on your Preferred Phone number that includes your specific medical information?  Yes      Allergies   Allergen Reactions    Penicillins Anaphylaxis, Hives and Shortness Of Breath      Current Outpatient Medications:     amLODIPine (NORVASC) 5 mg tablet, Take 5 mg by mouth daily, Disp: , Rfl:     amLODIPine-Valsartan-HCTZ 5-160-12.5 MG TABS, Take 12.5 mg by mouth in the morning, Disp: , Rfl:     betamethasone dipropionate (DIPROSONE) 0.05 % cream, Apply BID for up to 2 weeks to affected skin on neck down prn flares then take one week break and " can repeat regimen prn flares. Do not apply to groin, skin folds, face., Disp: 45 g, Rfl: 0    clobetasol (TEMOVATE) 0.05 % cream, Apply BID for up to 2 weeks to affected skin on neck down prn flares then take one week break and can repeat regimen prn flares. Do not apply to groin, skin folds, face., Disp: 60 g, Rfl: 3    hydrochlorothiazide (HYDRODIURIL) 25 mg tablet, Take 25 mg by mouth in the morning, Disp: , Rfl:     montelukast (SINGULAIR) 10 mg tablet, Take 10 mg by mouth in the morning, Disp: , Rfl:     pravastatin (PRAVACHOL) 40 mg tablet, Take 40 mg by mouth in the morning, Disp: , Rfl:     triamcinolone (KENALOG) 0.1 % cream, Apply 1 application. topically 2 (two) times a day (Patient not taking: Reported on 3/6/2023), Disp: , Rfl:           Whom besides the patient is providing clinical information about today's encounter?   NO ADDITIONAL HISTORIAN (patient alone provided history)    Physical Exam and Assessment/Plan by Diagnosis:  SEBORRHEIC KERATOSES  - Relevant exam: Scattered over the trunk/extremities are waxy brown to black plaques and papules with stuck on appearance and consistent dermoscopy  - Exam and clinical history consistent with seborrheic keratoses  - Counseled that these are benign growths that do not require treatment  - Counseled that removal of lesions is considered cosmetic and so would incur a fee should patient elect to move forward.       MELANOCYTIC NEVI  -Relevant exam: Scattered over the trunk/extremities are homogenously pigmented brown macules and papules. ELM performed and without concerning findings. No outliers unless otherwise noted in today's note  - Exam and clinical history consistent with melanocytic nevi  - Educated on the ABCDE's of melanoma; handout provided  - Counseled to return to clinic prior to scheduled appointment should any of these lesions change or should any new lesions of concern arise  - Counseled on use of sun protection daily. Reviewed latest FDA  sunscreen guidelines, including use of broad spectrum (UVA and UVB blocking) sunscreen or sun protective clothing with SPF 30-50 every 2-3 hours and reapplied after exposure to water; use of photoprotective clothing, including a broad brim hat and UPF rated clothing if outdoors for several hours; avoid use of tanning beds as these pose significant risk for melanoma and skin cancer.    LENTIGINES  OTHER SKIN CHANGES DUE TO CHRONIC EXPOSURE TO NONIONIZING RADIATION  - Relevant exam: Over sun exposed areas are brown macules. ELM performed and without concerning findings.  - Exam and clinical history consistent with lentigines.  - Educated that these are indicative of prior sun exposure.   - Counseled to return to clinic prior to scheduled appointment should any of these lesions change or should any new lesions of concern arise.  - Recommended use of sunscreen as above and below.  - Counseled on use of sun protection daily. Reviewed latest FDA sunscreen guidelines, including use of broad spectrum (UVA and UVB blocking) sunscreen or sun protective clothing with SPF 30-50 every 2-3 hours and reapplied after exposure to water; use of photoprotective clothing, including a broad brim hat and UPF rated clothing if outdoors for several hours; avoid use of tanning beds as these pose significant risk for melanoma and skin cancer.    CHERRY ANGIOMAS  - Relevant exam: Scattered over the trunk/extremities are red papules  - Exam and clinical history consistent with cherry angiomas  - Educated that these are benign  - Educated that removal is considered aesthetic and would incur a fee.      ACTINIC KERATOSES  - Relevant exam: On the right superior helix of the ear are scaly pink macules without palpable dermal component    - Exam and clinical history consistent with actinic keratoses  - Discussed that these lesions are considered premalignant with the potential to evolve into squamous cell carcinoma.   - Discussed that these are due  to chronic sun exposure and therefore recommend use of sunscreen/sun protection to prevent further sun damage  - Discussed treatment options, including liquid nitrogen destruction, topical immunotherapy, and photodynamic therapy, including risks, benefits  - Patient counseled to return to the office in 4-6 weeks after completion of treatment for recheck if not resolved at which time retreatment or biopsy to rule out SCC will be determined based on clinic findings      OPTION 1:    PROCEDURE:  DESTRUCTION OF PRE-MALIGNANT LESIONS    - After a thorough discussion of treatment options and risk/benefits/alternatives (including but not limited to local pain, scarring, dyspigmentation, blistering, and possible superinfection), verbal and written consent were obtained and the aforementioned lesions were treated on with cryotherapy using liquid nitrogen x 1 cycle for 5-10 seconds.    TOTAL NUMBER of 1 pre-malignant lesions were treated today on the ANATOMIC LOCATION: right ear.     The patient tolerated the procedure well, and after-care instructions were provided.     HISTORY OF BASAL CELL CARCINOMA    Physical Exam:  Anatomic Location Affected:  right upper back  Morphological Description of scar:  well healed scar  Suspected Recurrence: No  Pertinent Positives:  Pertinent Negatives:      Additional History of Present Condition:  History of basal cell carcinoma with no sign of recurrence    Assessment and Plan:  Based on a thorough discussion of this condition and the management approach to it (including a comprehensive discussion of the known risks, side effects and potential benefits of treatment), the patient (family) agrees to implement the following specific plan:  Follow up yearly skin checks    How can basal cell carcinoma be prevented?  The most important way to prevent BCC is to avoid sunburn. This is especially important in childhood and early life. Fair skinned individuals and those with a personal or family  history of BCC should protect their skin from sun exposure daily, year-round and lifelong.  Stay indoors or under the shade in the middle of the day   Wear covering clothing   Apply high protection factor SPF50+ broad-spectrum sunscreens generously to exposed skin if outdoors   Avoid indoor tanning (sun beds, solaria)  Oral nicotinamide (vitamin B3) in a dose of 500 mg twice daily may reduce the number and severity of BCCs.    What is the outlook for basal cell carcinoma?  Most BCCs are cured by treatment. Cure is most likely if treatment is undertaken when the lesion is small.  About 50% of people with BCC develop a second one within 3 years of the first. They are also at increased risk of other skin cancers, especially melanoma. Regular self-skin examinations and long-term annual skin checks by an experienced health professional are recommended.     DERMATITIS- biopsy proven dermal hypersensitivity reaction     Physical Exam: scattered erythematous scaly eczematous appearing plaques, many excoriated on trunk and extremities  Pertinent Positives:  Pertinent Negatives:       Additional History of Present Condition:  Pt has had a rash on different areas of the body for the past 4-5 years. Pt reports itching and scratching. Pt has been using Clobetasol 0.05% cream and Betamethasone dipropionate 0.05% cream.  Biopsy proven 3/6/23  Accession Number [id number]. Skin, Right anterior shin, Punch biopsy:  Features most consistent with DERMAL HYPERSENSITIVITY REACTION. At the time, I had suspected could be related to a medication (possible his combo amlodipine/HCTZ/valsartan vs HCTZ vs statin). Patient states he did do trial of only a week or so off all his meds without relief when he had Covid. I cannot find clear docmentation in our system or care everywhere, but appears he was switched to amlodipine in April rather than the combo and is on HCTZ and continues on his statin. The combo with valsartan has been stopped, but  symptoms persist.     Assessment and Plan: Patient with biopsy proven DHR. Removal of valsartan did not clear, but amlodipine, HCTZ, and statin still possible causes. Patient gets relief with clobetasol so we will continue for now, and consider adding dupixent. Risks of dupixent reviewed to include injection/allergic site reactions, eye symptoms such as conjunctivitis, facial dermatitis, etc. Will seek PA. Will also revisit idea of med holiday one at a time for 6-8 weeks to see if could be related. Would suggest HCTZ as next med to try avoidance then amlodipine, then statin.       Will send Clobetasol cream through [employer]: Apply BID for up to 2 weeks to affected skin on neck down prn flares then take one week break and can repeat regimen prn flares. Do not apply to groin, skin folds, face.    Discussed Dupixent; recommend seeking PA for approval for 600 mg loading dose then 300 mg every other week; ASE reviewed as above       CYST   Physical Exam:  Anatomic Location Affected:  RIGHT UPPER BACK  Morphological Description:  1 cm soft mobile subcutaneous nodule w punctum  Pertinent Positives:  Pertinent Negatives:    Additional History of Present Condition:  FOUND ON EXAM    Assessment and Plan:  Based on a thorough discussion of this condition and the management approach to it (including a comprehensive discussion of the known risks, side effects and potential benefits of treatment), the patient (family) agrees to implement the following specific plan:  - patient to schedule excision before leaving      Scribe Attestation      I,:  China Nguyen MA am acting as a scribe while in the presence of the attending physician.:       I,:  Maru Melgar MD personally performed the services described in this documentation    as scribed in my presence.:

## 2024-02-19 NOTE — TELEPHONE ENCOUNTER
Patient was seen today he asked for a refill on betamethasone instead of clobetasol.  Please discontinue the clobetasol.      Please review pended medication and send to   [facility] - [place] - [address], [address]

## 2024-04-11 ENCOUNTER — TELEPHONE (OUTPATIENT)
Age: 79
End: 2024-04-11

## 2024-04-11 ENCOUNTER — PROCEDURE VISIT (OUTPATIENT)
Dept: DERMATOLOGY | Facility: CLINIC | Age: 79
End: 2024-04-11

## 2024-04-11 VITALS — TEMPERATURE: 97.9 F | HEIGHT: 69 IN | BODY MASS INDEX: 29.51 KG/M2 | WEIGHT: 199.2 LBS

## 2024-04-11 DIAGNOSIS — L82.0 INFLAMED SEBORRHEIC KERATOSIS: ICD-10-CM

## 2024-04-11 DIAGNOSIS — L30.9 ECZEMA, UNSPECIFIED TYPE: ICD-10-CM

## 2024-04-11 DIAGNOSIS — D48.5 NEOPLASM OF UNCERTAIN BEHAVIOR OF SKIN: Primary | ICD-10-CM

## 2024-04-11 PROCEDURE — 88304 TISSUE EXAM BY PATHOLOGIST: CPT | Performed by: DERMATOLOGY

## 2024-04-11 NOTE — Clinical Note
Please obtain prior Auth for Dupixent for 600mg loading dose then 300mg every other week for eczema

## 2024-04-11 NOTE — TELEPHONE ENCOUNTER
PA for Dupixent 300mg pen     Submitted via    [x][id number]  []Surescripts-Case ID #   []Faxed to plan   []Other website   []Phone call Case ID #     Office notes sent, clinical questions answered. Awaiting determination    Turnaround time for your insurance to make a decision on your Prior Authorization can take 7-21 business days.

## 2024-04-11 NOTE — PROGRESS NOTES
"[facility] Note     Patient Name: Joby Gill  Encounter Date: 04/11/24     Have you been cared for by a [facility]atologist in the last 3 years and, if so, which description applies to you?    Yes.  I have been here within the last 3 years, and my medical history has NOT changed since that time.  I am MALE/not capable of bearing children.    REVIEW OF SYSTEMS:  Have you recently had or currently have any of the following? No changes in my recent health.   PAST MEDICAL HISTORY:  Have you personally ever had or currently have any of the following?  If \"YES,\" then please provide more detail. No changes in my medical history.   HISTORY OF IMMUNOSUPPRESSION: Do you have a history of any of the following:  Systemic Immunosuppression such as Diabetes, Biologic or Immunotherapy, Chemotherapy, Organ Transplantation, Bone Marrow Transplantation?  No     Answering \"YES\" requires the addition of the dotphrase \"IMMUNOSUPPRESSED\" as the first diagnosis of the patient's visit.   FAMILY HISTORY:  Any \"first degree relatives\" (parent, brother, sister, or child) with the following?    No changes in my family's known health.   PATIENT EXPERIENCE:    Do you want the Dermatologist to perform a COMPLETE skin exam today including a clinical examination under the \"bra and underwear\" areas?  No   If necessary, do we have your permission to call and leave a detailed message on your Preferred Phone number that includes your specific medical information?  Yes      Allergies   Allergen Reactions    Penicillins Anaphylaxis, Hives and Shortness Of Breath      Current Outpatient Medications:     amLODIPine (NORVASC) 5 mg tablet, Take 5 mg by mouth daily, Disp: , Rfl:     betamethasone dipropionate (DIPROSONE) 0.05 % cream, Apply BID for up to 2 weeks to affected skin on neck down prn flares then take one week break and can repeat regimen prn flares. Do not apply to groin, skin folds, face., Disp: 45 g, Rfl: 2    " hydrochlorothiazide (HYDRODIURIL) 25 mg tablet, Take 25 mg by mouth in the morning, Disp: , Rfl:     montelukast (SINGULAIR) 10 mg tablet, Take 10 mg by mouth in the morning, Disp: , Rfl:     pravastatin (PRAVACHOL) 40 mg tablet, Take 40 mg by mouth in the morning, Disp: , Rfl:     triamcinolone (KENALOG) 0.1 % cream, Apply 1 application. topically 2 (two) times a day (Patient not taking: Reported on 3/6/2023), Disp: , Rfl:           Whom besides the patient is providing clinical information about today's encounter?   NO ADDITIONAL HISTORIAN (patient alone provided history)    Physical Exam and Assessment/Plan by Diagnosis:    EPIDERMAL INCLUSION CYST    Physical Exam:  (Anatomic Location); (Size and Morphological Description); (Differential Diagnosis):  Specimen A;  Right upper back; 1 cm; soft mobile subcutaneous nodule w punctum   Pertinent Positives:  Pertinent Negatives:    Additional History of Present Condition:  patient present on 2/19/24 with a cyst on the back.         PROCEDURE:  EXCISION WITH INTERMEDIATE LAYERED CLOSURE     Attending:   Assistant: Marlin Muniz     Pre-Op Diagnosis: EIC   Post-Op Diagnosis: Same   Benign versus Malignant benign      Lesion Anatomic Location: Upper right back  Pre-op size: 1 cm  Size of defect:  1 cm  Final repaired wound length:  1.5 cm    Written and verbal, witnessed informed consent was obtained. I discussed that excision is a method of removing lesions both benign and malignant lesions.  A portion of normal skin is often taken to ensure completeness of removal.  I reviewed that procedure will include numbing the area,  cutting around and under defect, undermining tissue, and closing the wound with sutures both inside and out.  These sutures are usually removed in 7 to 14 days. Risks (bleeding, pain, infection, scarring, recurrence) and benefits discussed. It was discussed with patient that every effort is made to minimize scar, but scarring is influenced also  by extrinsic factor such as location, age and genetics.     Time Out: performed:  yes  Correct patient: yes.   Correct site per Clinic Report: yes  Correct site per Patient Report: yes    LOCAL ANESTHESIA: 1% Lidocaine with EPI  HCL    DESCRIPTION OF PROCEDURE: The patient was brought back into the procedure room, anesthetized locally, prepped and draped in the usual fashion. Using 8 mm punch tool, the lesion was excised to subcutaneous fat and dissected out. The wound was  undermined in the  fascial plane. Hemostasis was achieved with lsuture ligation. Purpose of undermining was to decrease wound tension and facilitate closure.    The wound was closed with subcutaneous sutures as follows:    Deep suture:3-0 vicryl       Epidermal edge closure was accomplished with superficial sutures as follows:    Superficial suture: 3-0 prolene   Superficial suture type Running     Estimated blood loss less than 3ml.    The patient tolerated the procedure well without any complications. The wound was cleaned with sterile saline, dried off, surgical vaseline ointment was applied, and the wound was covered. A pressure dressing was applied for stabilization and light pressure. The patient was given detailed oral and written instructions on postoperative care as detailed in consent. The patient left in good medical condition.    POSTOP DISCUSSION DISCUSSION AND INSTRUCTIONS FOR PATIENT      Rationale for Procedure  A skin excision allows the dermatologist to remove a lesion. The procedure involves a local numbing medication and removing the entire lesion. Typically, the lesion is being removed because it is atypical, traumatized, or for significant appearance reasons.  The area will be open like a brush burn and allowed to heal.   There will be no sutures.Tissue is sent to pathologist who will reconfirm diagnosis and verify completeness of lesion removal.    Description of Procedure  We would like to perform a skin excision today.  A  local anesthetic, similar to the kind that a dentist uses when filling a cavity, will be injected with a very small needle into the skin area to be sampled.  The injected skin and tissue underneath should “go to sleep” and become numbed so that no further pain should be felt.  A scalpel will be used to cut around the lesion and tissue will be submitted to pathology for examination.  Depending on the diagnosis the lesion will be excised with a certain amount of normal skin to help assure completeness of lesion removal.  The physician will discuss in advance the anticipated size and extent of removal.   Bleeding will occur, but it will stopped with direct pressure, sutures, and electrocautery.    Surgical “Vaseline-type” ointment will also applied after the procedure to help create a barrier between the wound and the outside world.      Risks and Potential Complications  The advantage of a skin excision is that it allows us to remove a problem lesion quickly.  Although this usually permits the lesion to heal as soon as possible with the least scarring, there are some risks and potential complications that include but are not limited to the following:  Some bleeding is normal at time of procedure and some bleeding on gauze is normal  the first few days after surgery.  Profuse bleeding and bleeding with swelling and pain should be reported as detailed  below  Infection is uncommon in skin surgery.  Infection should be reported and is indicated by pain, redness, and discharge of purulent material.  Some dull to at time sharp pain could occur initially the day after surgery.  Persistent pain or increasing pain days after surgery is not expected and should be reported.  Every effort is made to minimize scar, but location, size, and genetics do play a role in scar appearance.  A surgical wound does not achieve its optimal appearance until 6 months.  There are several treatments available if scarring would be problematic  including scar creams, silicone pad, laser and scar revision.  Skin discoloration can occur especially in people of color.  Its important to avoid sun on wound in first 6 months after surgery.  Treatment is available if pigment is problematic.  Incomplete removal of the lesion or recurrence of lesion can occur and this would then require further treatment and more surgery.  Nerve Damage/Numbness/Loss of Function is very rare, but is most likely to occur if lesion is large or if it is in a high risk location  Allergic Reaction to lidocaine is rare.  More commonly,  epinephrine is used  with the lidocaine.  Occasionally, epinephrine (aka adrenalin) may cause a brief  feeling of anxiety or jitteriness.  The person at the microscope  (pathologist) may provide additional information that was unexpected. This unexpected finding could provoke the need for additional treatment or evaluation.    What You Will Need to Do After the Procedure  Keep the area clean and dry the first day. Try NOT to remove the bandage for the first day.  Gently clean the area with soap and water and apply Vaseline ointment (this is over the counter and not a prescription) to the excision  site for up to 2 weeks.    Apply a clean appropriately sized bandage to area.  Gauze and paper tape are recommended for sensitive skin.  Return for suture removal as instructed (generally 1 week for the face, 2 weeks for the body).  Take Acetaminophen (Tylenol) for discomfort, if no contraindications.  Do NOT take Ibuprofen or aspirin unless specifically told to do so by your Dermatologist because these medications can make bleeding worse.  Call our office immediately for signs of infection: fever, chills, increased redness, warmth, tenderness, discomfort/pain, or pus or foul smell coming from the wound.    If bleeding is noticed, place a clean cloth over the area and apply firm pressure for thirty minutes.  Check the wound ONLY after 30 minutes of direct  pressure; do not cheat and sneak a peak, as that does not count.  If bleeding persists after 30 minutes of legitimate direct pressure, then try one more round of direct pressure for an additional 10 minutes to the area.  Should the bleeding become heavier or not stop after the second attempt, call [facility] directly at [phone] (SKIN) or, if after hours, go to your local Emergency Room/Emergency Department.     SEBORRHEIC KERATOSIS; INFLAMED    Physical Exam:  Anatomic Location Affected: left frontal scalp     Morphological Description:  5 mm waxy brown papule with surrounding inflammation and crust  Pertinent Positives:  Pertinent Negatives:    Additional History of Present Condition:  Patient reports new bumps on the skin. Site is itching and getting traumatized/bleeding with grooming.    Assessment and Plan:  Based on a thorough discussion of this condition and the management approach to it (including a comprehensive discussion of the known risks, side effects and potential benefits of treatment), the patient (family) agrees to implement the following specific plan:  Assured benign  Cryotherapy in office today     Seborrheic Keratosis  A seborrheic keratosis is a harmless warty spot that appears during adult life as a common sign of skin aging.  Seborrheic keratoses can arise on any area of skin, covered or uncovered, with the usual exception of the palms and soles. They do not arise from mucous membranes. Seborrheic keratoses can have highly variable appearance.      Seborrheic keratoses are extremely common. It has been estimated that over 90% of adults over the age of 60 years have one or more of them. They occur in males and females of all races, typically beginning to erupt in the 30s or 40s. They are uncommon under the age of 20 years.  The precise cause of seborrhoeic keratoses is not known.  Seborrhoeic keratoses are considered degenerative in nature. As time goes by, seborrheic  "keratoses tend to become more numerous. Some people inherit a tendency to develop a very large number of them; some people may have hundreds of them.    The name \"seborrheic keratosis\" is misleading, because these lesions are not limited to a seborrhoeic distribution (scalp, mid-face, chest, upper back), nor are they formed from sebaceous glands, nor are they associated with sebum -- which is greasy.  Seborrheic keratosis may also be called \"SK,\" \"Seb K,\" \"basal cell papilloma,\" \"senile wart,\" or \"barnacle.\"      Researchers have noted:  Eruptive seborrhoeic keratoses can follow sunburn or dermatitis  Skin friction may be the reason they appear in body folds  Viral cause (e.g., human papillomavirus) seems unlikely  Stable and clonal mutations or activation of FRFR3, PIK3CA, EDUIN, AKT1 and EGFR genes are found in seborrhoeic keratoses  Seborrhoeic keratosis can arise from solar lentigo  FRFR3 mutations also arise in solar lentigines. These mutations are associated with increased age and location on the head and neck, suggesting a role of ultraviolet radiation in these lesions  Seborrheic keratoses do not harbour tumour suppressor gene mutations  Epidermal growth factor receptor inhibitors, which are used to treat some cancers, often result in an increase in verrucal (warty) keratoses.    There is no easy way to remove multiple lesions on a single occasion.  Unless a specific lesion is \"inflamed\" and is causing pain or stinging/burning or is bleeding, most insurance companies do not authorize treatment.       PROCEDURE:  DESTRUCTION OF BENIGN LESIONS WITH CRYOTHERAPY  After a thorough discussion of treatment options and risk/benefits/alternatives (including but not limited to local pain, scarring, dyspigmentation, blistering, and possible superinfection), verbal and written consent were obtained and the aforementioned lesions were treated on with cryotherapy using liquid nitrogen x 1 cycle for 5-10 seconds.    TOTAL " NUMBER of 1 lesions were treated today on the ANATOMIC LOCATION: right frontal scalp.    The patient tolerated the procedure well, and after-care instructions were provided.      Scribe Attestation      I,:  Sofiya Muniz am acting as a scribe while in the presence of the attending physician.:       I,:  Maru Melgar MD personally performed the services described in this documentation    as scribed in my presence.:

## 2024-04-15 PROCEDURE — 88304 TISSUE EXAM BY PATHOLOGIST: CPT | Performed by: DERMATOLOGY

## 2024-04-30 DIAGNOSIS — L30.9 ECZEMA, UNSPECIFIED TYPE: ICD-10-CM

## 2024-04-30 RX ORDER — DUPILUMAB 300 MG/2ML
INJECTION, SOLUTION SUBCUTANEOUS
Qty: 4 ML | Refills: 10 | Status: SHIPPED | OUTPATIENT
Start: 2024-04-30

## 2024-04-30 RX ORDER — DUPILUMAB 300 MG/2ML
INJECTION, SOLUTION SUBCUTANEOUS
Qty: 8 ML | Refills: 0 | Status: SHIPPED | OUTPATIENT
Start: 2024-04-30

## 2024-04-30 NOTE — TELEPHONE ENCOUNTER
NOT A DUP!! Pharmacy's system was down and they need the script resent    Reason for call:   [x] Refill   [] Prior Auth  [] Other:     Office:   [] PCP/Provider -   [x] Specialty/Provider - Derm    Medication:        Does the patient have enough for 3 days?   [] Yes   [x] No - Send as HP to POD

## 2024-09-30 ENCOUNTER — TELEPHONE (OUTPATIENT)
Dept: DERMATOLOGY | Facility: CLINIC | Age: 79
End: 2024-09-30

## 2024-09-30 NOTE — TELEPHONE ENCOUNTER
LMOM that 11/13/24 appt w/Ethel was cx & rs to Dr Pacheco, due to change in her sched, please call the office to confirm or r/s to another date.

## 2024-11-18 ENCOUNTER — OFFICE VISIT (OUTPATIENT)
Dept: DERMATOLOGY | Facility: CLINIC | Age: 79
End: 2024-11-18
Payer: COMMERCIAL

## 2024-11-18 VITALS — BODY MASS INDEX: 28.5 KG/M2 | WEIGHT: 193 LBS | TEMPERATURE: 98.4 F

## 2024-11-18 DIAGNOSIS — D48.5 NEOPLASM OF UNCERTAIN BEHAVIOR OF SKIN: Primary | ICD-10-CM

## 2024-11-18 DIAGNOSIS — S30.860A TICK BITE OF BACK, INITIAL ENCOUNTER: ICD-10-CM
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
## 2024-11-18 PROCEDURE — 88342 IMHCHEM/IMCYTCHM 1ST ANTB: CPT | Performed by: STUDENT IN AN ORGANIZED HEALTH CARE EDUCATION/TRAINING PROGRAM

## 2024-11-18 PROCEDURE — 11104 PUNCH BX SKIN SINGLE LESION: CPT | Performed by: REGISTERED NURSE

## 2024-11-18 PROCEDURE — 88305 TISSUE EXAM BY PATHOLOGIST: CPT | Performed by: STUDENT IN AN ORGANIZED HEALTH CARE EDUCATION/TRAINING PROGRAM

## 2024-11-18 PROCEDURE — 99214 OFFICE O/P EST MOD 30 MIN: CPT | Performed by: REGISTERED NURSE

## 2024-11-18 PROCEDURE — 11103 TANGNTL BX SKIN EA SEP/ADDL: CPT | Performed by: REGISTERED NURSE

## 2024-11-18 PROCEDURE — 88341 IMHCHEM/IMCYTCHM EA ADD ANTB: CPT | Performed by: STUDENT IN AN ORGANIZED HEALTH CARE EDUCATION/TRAINING PROGRAM

## 2024-11-18 RX ORDER — DOXYCYCLINE 100 MG/1
200 CAPSULE ORAL ONCE
Qty: 2 CAPSULE | Refills: 0 | Status: SHIPPED | OUTPATIENT
Start: 2024-11-18 | End: 2024-11-18

## 2024-11-18 NOTE — PATIENT INSTRUCTIONS
"NEOPLASM OF UNCERTAIN BEHAVIOR OF SKIN    Assessment and Plan:  I have discussed with the patient that a sample of skin via a \"skin biopsy” would be potentially helpful to further make a specific diagnosis under the microscope.  Based on a thorough discussion of this condition and the management approach to it (including a comprehensive discussion of the known risks, side effects and potential benefits of treatment), the patient (family) agrees to implement the following specific plan:    Procedure:  Skin Biopsy.  After a thorough discussion of treatment options and risk/benefits/alternatives (including but not limited to local pain, scarring, dyspigmentation, blistering, possible superinfection, and inability to confirm a diagnosis via histopathology), verbal and written consent were obtained and portion of the rash was biopsied for tissue sample.  See below for consent that was obtained from patient and subsequent Procedure Note.     INFORMED CONSENT DISCUSSION AND POST-OPERATIVE INSTRUCTIONS FOR PATIENT    I.  RATIONALE FOR PROCEDURE  I understand that a skin biopsy allows the Dermatologist to test a lesion or rash under the microscope to obtain a diagnosis.  It usually involves numbing the area with numbing medication and removing a small piece of skin; sometimes the area will be closed with sutures. In this specific procedure, sutures are not usually needed.  If any sutures are placed, then they are usually need to be removed in 2 weeks or less.    I understand that my Dermatologist recommends that a skin \"shave\" biopsy be performed today.  A local anesthetic, similar to the kind that a dentist uses when filling a cavity, will be injected with a very small needle into the skin area to be sampled.  The injected skin and tissue underneath \"will go to sleep” and become numb so no pain should be felt afterwards.  An instrument shaped like a tiny \"razor blade\" (shave biopsy instrument) will be used to cut a small " "piece of tissue and skin from the area so that a sample of tissue can be taken and examined more closely under the microscope.  A slight amount of bleeding will occur, but it will be stopped with direct pressure and a pressure bandage and any other appropriate methods.  I understands that a scar will form where the wound was created.  Surgical ointment will be applied to help protect the wound.  Sutures are not usually needed.    II.  RISKS AND POTENTIAL COMPLICATIONS   I understand the risks and potential complications of a skin biopsy include but are not limited to the following:  Bleeding  Infection  Pain  Scar/keloid  Skin discoloration  Incomplete Removal  Recurrence  Nerve Damage/Numbness/Loss of Function  Allergic Reaction to Anesthesia  Biopsies are diagnostic procedures and based on findings additional treatment or evaluation may be required  Loss or destruction of specimen resulting in no additional findings    My Dermatologist has explained to me the nature of the condition, the nature of the procedure, and the benefits to be reasonably expected compared with alternative approaches.  My Dermatologist has discussed the likelihood of major risks or complications of this procedure including the specific risks listed above, such as bleeding, infection, and scarring/keloid.  I understand that a scar is expected after this procedure.  I understand that my physician cannot predict if the scar will form a \"keloid,\" which extends beyond the borders of the wound that is created.  A keloid is a thick, painful, and bumpy scar.  A keloid can be difficult to treat, as it does not always respond well to therapy, which includes injecting cortisone directly into the keloid every few weeks.  While this usually reduces the pain and size of the scar, it does not eliminate it.      I understand that photographs may be taken before and after the procedure.  These will be maintained as part of the medical providers confidential " "records and may not be made available to me.  I further authorize the medical provider to use the photographs for teaching purposes or to illustrate scientific papers, books, or lectures if in his/her judgment, medical research, education, or science may benefit from its use.    I have had an opportunity to fully inquire about the risks and benefits of this procedure and its alternatives.   I have been given ample time and opportunity to ask questions and to seek a second opinion if I wished to do so.  I acknowledge that there have specifically been no guarantees as to the cosmetic results from the procedure.  I am aware that with any procedure there is always the possibility of an unexpected complication.    III. POST-PROCEDURAL CARE (WHAT YOU WILL NEED TO DO \"AFTER THE BIOPSY\" TO OPTIMIZE HEALING)    Keep the area clean and dry.  Try NOT to remove the bandage or get it wet for the first 24 hours.    Gently clean the area and apply surgical ointment (such as Vaseline petrolatum ointment, which is available \"over the counter\" and not a prescription) to the biopsy site for up to 2 weeks straight.  This acts to protect the wound from the outside world.      Sutures are not usually placed in this procedure.  If any sutures were placed, return for suture removal as instructed (generally 1 week for the face, 2 weeks for the body).      Take Acetaminophen (Tylenol) for discomfort, if no contraindications.  Ibuprofen or aspirin could make bleeding worse.    Call our office immediately for signs of infection: fever, chills, increased redness, warmth, tenderness, discomfort/pain, or pus or foul smell coming from the wound.    WHAT TO DO IF THERE IS ANY BLEEDING?  If a small amount of bleeding is noticed, place a clean cloth over the area and apply firm pressure for ten minutes.  Check the wound after 10 minutes of direct pressure.  If bleeding persists, try one more time for an additional 10 minutes of direct pressure on the " area.  If the bleeding becomes heavier or does not stop after the second attempt, or if you have any other questions about this procedure, then please call your [facility]'s Dermatologist by calling [phone] (SKIN).     I hereby acknowledge that I have reviewed and verified the site with my Dermatologist and have requested and authorized my Dermatologist to proceed with the procedure.      TICK BITE OF BACK    Assessment and Plan:  Based on a thorough discussion of this condition and the management approach to it (including a comprehensive discussion of the known risks, side effects and potential benefits of treatment), the patient (family) agrees to implement the following specific plan:  Tick removal with forceps  Punch biopsy performed in office to clear out tick debris  Plan:  1. Instructed to keep the wound dry and covered for 24-48h and clean thereafter.  2. Warning signs of infection were reviewed.    3. Recommended that the patient use acetaminophen as needed for pain  4. Sutures if any should be removed in 10-14 days      Standard post-procedure care has been explained and has been included in written form within the patient's copy of Informed Consent.

## 2024-11-18 NOTE — PROGRESS NOTES
"[facility] Dermatology Clinic Note     Patient Name: Joby Gill  Encounter Date: 11/18/24     Have you been cared for by a [facility] Dermatologist in the last 3 years and, if so, which description applies to you?    Yes.  I have been here within the last 3 years, and my medical history has NOT changed since that time.  I am MALE/not capable of bearing children.    REVIEW OF SYSTEMS:  Have you recently had or currently have any of the following? No changes in my recent health.   PAST MEDICAL HISTORY:  Have you personally ever had or currently have any of the following?  If \"YES,\" then please provide more detail. No changes in my medical history.   HISTORY OF IMMUNOSUPPRESSION: Do you have a history of any of the following:  Systemic Immunosuppression such as Diabetes, Biologic or Immunotherapy, Chemotherapy, Organ Transplantation, Bone Marrow Transplantation or Prednisone?  No     Answering \"YES\" requires the addition of the dotphrase \"IMMUNOSUPPRESSED\" as the first diagnosis of the patient's visit.   FAMILY HISTORY:  Any \"first degree relatives\" (parent, brother, sister, or child) with the following?    No changes in my family's known health.   PATIENT EXPERIENCE:    Do you want the Dermatologist to perform a COMPLETE skin exam today including a clinical examination under the \"bra and underwear\" areas?  NO  If necessary, do we have your permission to call and leave a detailed message on your Preferred Phone number that includes your specific medical information?  Yes      Allergies   Allergen Reactions    Penicillins Anaphylaxis, Hives and Shortness Of Breath      Current Outpatient Medications:     amLODIPine (NORVASC) 5 mg tablet, Take 5 mg by mouth daily, Disp: , Rfl:     betamethasone dipropionate (DIPROSONE) 0.05 % cream, Apply BID for up to 2 weeks to affected skin on neck down prn flares then take one week break and can repeat regimen prn flares. Do not apply to groin, skin folds, face., Disp: 45 g, " "Rfl: 2    Dupilumab (Dupixent) 300 MG/2ML SOPN, Inject 300mg on week 6 then every other week there after for maintenance dose., Disp: 4 mL, Rfl: 10    Dupilumab (Dupixent) 300 MG/2ML SOPN, Inject 600mg on week 0 then 300mg on week 2 and 4 for loading dose., Disp: 8 mL, Rfl: 0    hydrochlorothiazide (HYDRODIURIL) 25 mg tablet, Take 25 mg by mouth in the morning, Disp: , Rfl:     montelukast (SINGULAIR) 10 mg tablet, Take 10 mg by mouth in the morning, Disp: , Rfl:     pravastatin (PRAVACHOL) 40 mg tablet, Take 40 mg by mouth in the morning, Disp: , Rfl:     triamcinolone (KENALOG) 0.1 % cream, Apply 1 application. topically 2 (two) times a day (Patient not taking: Reported on 3/6/2023), Disp: , Rfl:           Whom besides the patient is providing clinical information about today's encounter?   NO ADDITIONAL HISTORIAN (patient alone provided history)    Physical Exam and Assessment/Plan by Diagnosis:    NEOPLASM OF UNCERTAIN BEHAVIOR OF SKIN    Physical Exam:  (Anatomic Location); (Size and Morphological Description); (Differential Diagnosis):  Specimen A; right helix; 0.6 cm hyperkeratotic papule with perilesional erythema ddx scc vs ak  Pertinent Positives:  Pertinent Negatives:    Additional History of Present Condition:  Present for a while. Previously had cryotherapy performed on it, but papule came back.     Assessment and Plan:  I have discussed with the patient that a sample of skin via a \"skin biopsy” would be potentially helpful to further make a specific diagnosis under the microscope.  Based on a thorough discussion of this condition and the management approach to it (including a comprehensive discussion of the known risks, side effects and potential benefits of treatment), the patient (family) agrees to implement the following specific plan:    Procedure:  Skin Biopsy.  After a thorough discussion of treatment options and risk/benefits/alternatives (including but not limited to local pain, scarring, " dyspigmentation, blistering, possible superinfection, and inability to confirm a diagnosis via histopathology), verbal and written consent were obtained and portion of the rash was biopsied for tissue sample.  See below for consent that was obtained from patient and subsequent Procedure Note.     PROCEDURE TANGENTIAL (SHAVE) BIOPSY NOTE:    Performing Physician:  Dr. Pacheco  Anatomic Location; Clinical Description with size (cm); Pre-Op Diagnosis:   Specimen A; right helix; 0.6 cm hyperkeratotic papule with perilesional erythema ddx scc vs ak  Post-op diagnosis: Same     Local anesthesia: 1% xylocaine with epi      Topical anesthesia: None    Hemostasis: Aluminum chloride       After obtaining informed consent  at which time there was a discussion about the purpose of biopsy  and low risks of infection and bleeding.  The area was prepped and draped in the usual fashion. Anesthesia was obtained with 1% lidocaine with epinephrine. A shave biopsy to an appropriate sampling depth was obtained by Shave (Dermablade or 15 blade) The resulting wound was covered with surgical ointment and bandaged appropriately.     The patient tolerated the procedure well without complications and was without signs of functional compromise.      Specimen has been sent for review by Dermatopathology.    Standard post-procedure care has been explained and has been included in written form within the patient's copy of Informed Consent.    INFORMED CONSENT DISCUSSION AND POST-OPERATIVE INSTRUCTIONS FOR PATIENT    I.  RATIONALE FOR PROCEDURE  I understand that a skin biopsy allows the Dermatologist to test a lesion or rash under the microscope to obtain a diagnosis.  It usually involves numbing the area with numbing medication and removing a small piece of skin; sometimes the area will be closed with sutures. In this specific procedure, sutures are not usually needed.  If any sutures are placed, then they are usually need to be removed in 2  "weeks or less.    I understand that my Dermatologist recommends that a skin \"shave\" biopsy be performed today.  A local anesthetic, similar to the kind that a dentist uses when filling a cavity, will be injected with a very small needle into the skin area to be sampled.  The injected skin and tissue underneath \"will go to sleep” and become numb so no pain should be felt afterwards.  An instrument shaped like a tiny \"razor blade\" (shave biopsy instrument) will be used to cut a small piece of tissue and skin from the area so that a sample of tissue can be taken and examined more closely under the microscope.  A slight amount of bleeding will occur, but it will be stopped with direct pressure and a pressure bandage and any other appropriate methods.  I understands that a scar will form where the wound was created.  Surgical ointment will be applied to help protect the wound.  Sutures are not usually needed.    II.  RISKS AND POTENTIAL COMPLICATIONS   I understand the risks and potential complications of a skin biopsy include but are not limited to the following:  Bleeding  Infection  Pain  Scar/keloid  Skin discoloration  Incomplete Removal  Recurrence  Nerve Damage/Numbness/Loss of Function  Allergic Reaction to Anesthesia  Biopsies are diagnostic procedures and based on findings additional treatment or evaluation may be required  Loss or destruction of specimen resulting in no additional findings    My Dermatologist has explained to me the nature of the condition, the nature of the procedure, and the benefits to be reasonably expected compared with alternative approaches.  My Dermatologist has discussed the likelihood of major risks or complications of this procedure including the specific risks listed above, such as bleeding, infection, and scarring/keloid.  I understand that a scar is expected after this procedure.  I understand that my physician cannot predict if the scar will form a \"keloid,\" which extends beyond " "the borders of the wound that is created.  A keloid is a thick, painful, and bumpy scar.  A keloid can be difficult to treat, as it does not always respond well to therapy, which includes injecting cortisone directly into the keloid every few weeks.  While this usually reduces the pain and size of the scar, it does not eliminate it.      I understand that photographs may be taken before and after the procedure.  These will be maintained as part of the medical providers confidential records and may not be made available to me.  I further authorize the medical provider to use the photographs for teaching purposes or to illustrate scientific papers, books, or lectures if in his/her judgment, medical research, education, or science may benefit from its use.    I have had an opportunity to fully inquire about the risks and benefits of this procedure and its alternatives.   I have been given ample time and opportunity to ask questions and to seek a second opinion if I wished to do so.  I acknowledge that there have specifically been no guarantees as to the cosmetic results from the procedure.  I am aware that with any procedure there is always the possibility of an unexpected complication.    III. POST-PROCEDURAL CARE (WHAT YOU WILL NEED TO DO \"AFTER THE BIOPSY\" TO OPTIMIZE HEALING)    Keep the area clean and dry.  Try NOT to remove the bandage or get it wet for the first 24 hours.    Gently clean the area and apply surgical ointment (such as Vaseline petrolatum ointment, which is available \"over the counter\" and not a prescription) to the biopsy site for up to 2 weeks straight.  This acts to protect the wound from the outside world.      Sutures are not usually placed in this procedure.  If any sutures were placed, return for suture removal as instructed (generally 1 week for the face, 2 weeks for the body).      Take Acetaminophen (Tylenol) for discomfort, if no contraindications.  Ibuprofen or aspirin could make " bleeding worse.    Call our office immediately for signs of infection: fever, chills, increased redness, warmth, tenderness, discomfort/pain, or pus or foul smell coming from the wound.    WHAT TO DO IF THERE IS ANY BLEEDING?  If a small amount of bleeding is noticed, place a clean cloth over the area and apply firm pressure for ten minutes.  Check the wound after 10 minutes of direct pressure.  If bleeding persists, try one more time for an additional 10 minutes of direct pressure on the area.  If the bleeding becomes heavier or does not stop after the second attempt, or if you have any other questions about this procedure, then please call your [facility]'s Dermatologist by calling [phone] (SKIN).     I hereby acknowledge that I have reviewed and verified the site with my Dermatologist and have requested and authorized my Dermatologist to proceed with the procedure.      TICK BITE OF BACK    Physical Exam:  Anatomic Location Affected: left upper back  Morphological Description:  ulcerated papule with perilesional erythema.   Pertinent Positives:  Pertinent Negatives:    Additional History of Present Condition:  Noticed tick on the left upper back during skin exams of the back. Tick doesn't appear engorged. No associated pruritus and or tenderness.     Assessment and Plan:  Based on a thorough discussion of this condition and the management approach to it (including a comprehensive discussion of the known risks, side effects and potential benefits of treatment), the patient (family) agrees to implement the following specific plan:  Tick removal with tweezers, however there appears to be parts of the tick left behind which couldn't be removed with tweezers.   Punch biopsy performed in office to remove tick parts.   Tick couldn't be speciated on exams because it was mostly crushed during exams. However given the prevalence of the [place] tick in the [facility] [place] and the risk of lyme disease I recommended doxycycline  200mg once as prophylaxis which patient agreed to. Advised to take with food, drink a full glass of water with it and not to lay down within 30 minutes of taking medication. Side effects including stomach upset, N/V, diarrhea, photosensitivity reviewed.     PROCEDURE NOTE:  PUNCH BIOPSY      Performing Physician:  Dr. Pacheco    Anatomic Location; Clinical Description with size (cm); Pre-Op Diagnosis:    Specimen B; left upper back; 5 mm ulcerated papule with perilesional erythema rule out tick part       Anesthesia: 1% xylocaine with epi       Topical anesthesia: None       Indications: To indicate diagnosis and management plan.    Procedure Details     Patient informed of the risks (including bleeding,scaring and infection) and benefits of the procedure explained. Verbal and written informed consent obtained. The area was prepped and draped in the usual fashion. Anesthesia was obtained with 1% lidocaine with epinephrine. The skin was then stretched perpendicular to the skin tension lines and a punch biopsy to an appropriate sampling depth was obtained with a 5 mm punch with a forceps and iris scissors.     Hemostasis was obtained with 4-0 Prolene x 2 sutures.     Complications:  None      Specimen has been sent for review by Dermatopathology.      Plan:  1. Instructed to keep the wound dry and covered for 24-48h and clean thereafter.  2. Warning signs of infection were reviewed.    3. Recommended that the patient use acetaminophen as needed for pain  4. Sutures if any should be removed in 10-14 days    Patient was offered a nurse visit for suture removal, but deferred, as wife is a nurse and will remove them    Standard post-procedure care has been explained and has been included in written form within the patient's copy of Informed Consent.    Scribe Attestation      I,:  Lucrecia Gasca am acting as a scribe while in the presence of the attending physician.:       I,:  Derrick Pacheco MD personally performed  the services described in this documentation    as scribed in my presence.:

## 2024-11-26 PROCEDURE — 88305 TISSUE EXAM BY PATHOLOGIST: CPT | Performed by: STUDENT IN AN ORGANIZED HEALTH CARE EDUCATION/TRAINING PROGRAM

## 2024-11-27 ENCOUNTER — RESULTS FOLLOW-UP (OUTPATIENT)
Age: 79
End: 2024-11-27

## 2024-11-27 ENCOUNTER — TELEPHONE (OUTPATIENT)
Dept: DERMATOLOGY | Facility: CLINIC | Age: 79
End: 2024-11-27

## 2024-11-27 DIAGNOSIS — D04.21 SQUAMOUS CELL CARCINOMA IN SITU (SCCIS) OF SKIN OF HELIX OF RIGHT EAR: Primary | ICD-10-CM

## 2024-11-27 NOTE — RESULT ENCOUNTER NOTE
DERMATOPATHOLOGY RESULT NOTE    Results reviewed by ordering physician.  Called patient to personally discuss results. Discussed results with patient.       Instructions for Clinical Derm Team:   (remember to route Result Note to appropriate staff):    None    Result & Plan by Specimen:    Specimen A: malignant  Plan: MOHs      Specimen B: consistent with an arthropod bite  Plan: No further treatment needed.       A. Skin, right helix, shave biopsy:    At least SQUAMOUS CELL CARCINOMA IN SITU; transected (see note).    Note: SOX10 and p40 immunostains were reviewed and support the diagnosis. Multiple levels examined.       B. Skin, left upper back, punch biopsy:    Ulceration with superficial dermal and periadnexal neutrophilic-rich inflammatory infiltrate (see note).    Note:  The histopathologic findings are non-specific; in the appropriate clinical context, the histopathologic findings are compatible with arthropod assault. Remnants of tick are not seen. Clinical pathologic correlation is advised. CD3, CD20, MPO, , and  immunostains were reviewed; findings diagnostic of hematologic malignancy or a lymphoproliferative disorder are not appreciated. If the lesion were to progress or persist, additional sampling should be sought.    Amendment electronically signed by Vivek Sommers MD on 11/26/2024 at 1433 EST   Electronically signed by Vivek Sommers MD on 11/26/2024 at 1431 EST

## 2024-11-27 NOTE — LETTER
Joby Gill     1945    [address] [facility] [id number]    Dear Joby Gill,    You are scheduled to have the MOHS procedure on January 6, 2025 at 10 am for right ear with Dr.Ryan Mills. Our office is located in [facility] our address is [address] [place] [address]. Once you arrive please check in with our front staff in [address] and they will escort you to the MOHS waiting room.  If you have someone bringing you to your appointment they may wait in the waiting room or accompany you in your visit.      Below you will find some pre-op instructions along with some information regarding the MOHS procedure.     If you have any questions please call our office at [phone].       Thank you,    [facility]         PRE-OPERATIVE INSTRUCTIONS - MOHS    Before your scheduled surgery, there are a number of important precautions and positive steps you should take to help prepare yourself for a successful treatment and speedy recovery.    Some of the steps, which are listed below, may seem unnecessary and inconvenient, but they are important. For example, when you stop smoking, you increase your ability to heal. Occasionally, there may be valid reasons, personal or medical, why you can't comply. In such cases, please call the office so we can discuss possible ways to overcome any obstacles you may be encountering.    If you have any questions about the surgery, or remember additional medical information that you forgot to mention to our staff, please contact the office prior to your surgery.    GENERAL INFORMATION REGARDING MOHS MICROGRAPHIC SURGERY    Mohs surgery is a specialized technique for the removal of skin cancer developed by Dr. Frederick Mohs over 50 years ago to improve the cure rates of skin cancer. Traditionally, skin cancers are treated by destructive methods (radiation, freezing, scraping, and  burning) or excision (cutting out the tissue with standards margins and sending it to an outside laboratory for testing). These methods all yield cure rates between 65%-94%. However, for cancers located in cosmetically sensitive areas, large tumors, or tumors unsuccessfully treated by other means, Mohs surgery offers a higher cure rate. In most cases, Mohs surgery provides you with a 99% cure rate for primary (previously untreated) basal cell cancer and a 95% cure rate for primary squamous cell cancer. In Mohs surgery, tissue is removed and processed in a way that we are able to check 100% of the margins, giving the highest cure rate for any method of treating skin cancers while providing maximal preservation of normal skin. This allows the surgeon to produce an optimal cosmetic result for the patient by maximizing the amount of tissue removed yielding as small a scar as possible    On the day of surgery, you will be given local anesthesia only (similar to what was given to you during your initial biopsy). You will remain awake. You will verify the location of the skin cancer prior to the onset of the surgery. Once the area is numb, the tissue containing the skin cancer will be removed, taking a small safety margin. This margin is usually smaller than what would be taken with a standard excision. Once the tissue is removed, it is marked and oriented. The first layer (“Stage I”) will be processed in our laboratory. The wound will be treated for bleeding and a bandage will be placed to keep you comfortable while you wait an approximate 45 minutes-1 hour (for the processing of the tissue) in your room. Your Mohs surgeon will examine the pathology in the lab, checking all the margins. If any tumor remains, you will need to take a second layer of skin (“Stage 2”). The area will be re-anesthetized and your Mohs surgeon will remove more skin only in the area where the tumor exists. This process will continue until all the  skin cancer is removed. Unfortunately, there is no method to predict how many layers or stages will be taken.    Once the tumor has been removed completely, we will discuss the best ways to close the defect. Most wounds may be closed with stitches. A larger wound may require a skin graft or a flap. In rare instances, especially for cancers around the eye or for larger cancers, we may work with another surgeon (oculoplastic, ENT, plastics) with special skills to assist with reconstruction.  If the surgery is coordinated with another specialist, you will have the Mohs portion of the procedure first and see the coordinated specialist after the skin cancer has been removed.  Always follow the pre-operative instructions of the surgeon doing the closure.      Medications: Please take all your normal medications the morning of your surgery. If you are a diabetic, please bring your insulin or medications with you, as well as a snack to avoid having low blood sugar during your day with us.    1) Blood Thinners    VERY IMPORTANT: We do NOT stop or hold prescribed blood thinners (such as Coumadin/Warfarin, Plavix, Eliquis, Pradaxa, Brilinta, Apixaban, Xarelto, Lovonox, Rivaroxaban, or Aggrenox) before Mohs surgery. Additionally, If you take aspirin because you have had a stroke, heart attack, heart disease, other condition, or your physician has prescribed you to take it, please continue your aspirin.    Although there is a risk of increased bruising and bleeding, we are still able to safely perform surgery while continuing these medications. Please NEVER stop your prescribed blood thinner without the managing doctor's (the doctor that prescribed the medication) permission or knowledge. If you have any concerns about not holding your blood thinner, please address these with your Mohs surgeon.    Most people should stop all non-steroidal anti-inflammatory medications (Motrin, Naproxen, Advil, Midol, Aleve, etc.) for 7 days  prior to your scheduled surgery and 2 days after (unless instructed otherwise after surgery).  You may take Tylenol for pain.     Vitamins and Supplements  Avoid taking any supplements with Vitamin E, Fish Oil, Gingko, Ginseng, and Garlic for 2 weeks before and 2 days after your surgery. These thin your blood.    Lidocaine Patches  Avoid wearing any over the counter or prescribed Lidocaine patches the day of the surgery.    Alcohol: Avoid drinking alcohol for 2 days prior to your surgery, and for 2 days afterwards (it thins the blood and causes more bruising and swelling).    Smoking: Try to STOP or reduce smoking significantly the week before your surgery, and especially the week afterwards (it greatly improves how well you heal). Tobacco smoke deprives the blood of oxygen, which is urgently needed by the wound during the healing process.    Contact Lenses: Do not wear them on the day of the surgery. Instead, wear glasses and bring your case, in case we need to remove them.    Clothing: Do not wear your nicest clothing on your surgery day. We recommend wearing a button down shirt that will not disrupt your post-operative dressing when changing later that night. Please avoid wearing jeans during the procedure to help prevent damage to our equipment.     Bathing: On the morning of your surgery, you may bathe or shower normally. If you get your hair done on a weekly basis, remember to get your hair washed the day before surgery.   - You will need to keep your surgical site dry for a minimum of 48 hours after surgery.    Makeup: If your surgery is on the face, please do not wear any makeup on the day of the surgery.    Jewelry: Please try to avoid wearing jewelry on the day of surgery.    Food: On the morning of surgery, have breakfast but limit your intake of caffeinated beverages. They are diuretic and may inconvenience you during surgery. If you are following up with another surgeon the same day as your Mohs  surgery, you must receive permission to eat breakfast from that surgeon.      What to bring with you on the day of your surgery:  Bring snacks - Since you could be at the office long, you may bring snacks and/or lunch with you. Some snacks and drinks are available at the office as well.   Bring a sweater - Bring a sweater or jacket that buttons or zips down the front and will not disturb your wound dressing during removal.  Bring something to do - You will be spending much of the day in our office. There will be 45-60 minute waiting periods  between layers/stages, and while there is a television with cable in every room, it is nice to have something to keep you occupied such as books, magazines, knitting, music, or work.     Planning Ahead:  Other Appointments - It is important to realize that no matter how small the skin cancer appears to be, looks can be deceiving. Since your surgery may last the entire day, you should not schedule any other appointments that day.  Special Occasions - Surgery often creates swelling and bruising. Also, the post-op dressing may be rather large and obvious. Keep this in mind as you arrange your social/work schedule. If an important event is already planned, please check with your referring physician or your Mohs surgeon to see if the surgery can be postponed.  Activity Limits after Surgery - If surgery was performed on your face, we recommend that you keep your activity level to a minimum for 2-3 days (the blood pressure elevation related to exercise can lead to bleeding). If you have stitches in an area that will be under tension or significant movement (neck, back, arms, legs), you will need to avoid heavy lifting (anything over 5 lbs) or exercise for at least 2 weeks and possibly longer. We also advise that you limit out of town travel for the first 7 days after surgery. You should also wait at least 7 days before going into a pool or the ocean.  Housework - Since you will need to  minimize activity after surgery, plan to do your groceries, laundry, gardening, and other heavy household chores prior to your surgery. Please make arrangements for assistance during the post-op period. If surgery is around your mouth area, you may need to eat soft foods, such as soup, milkshakes, or yogurt for 48 hours.    Purchasing bandage supplies: Prior to surgery, please purchase the following items to care for your surgical wound properly.  Cotton swabs (Q-tips)  Vaseline or Aquaphor  Telfa pads (or any non-stick dressing)  Paper tape or Hypafix tape  Gauze pads (3x3)    Transportation: It is often reassuring and comforting to have a  drive you to and from the surgery. He or she is welcome to wait in the office during the surgery. If you do not have a , you may drive to and from your procedure (unless stated otherwise). If the site being treated is near your eye, be aware that the final bandage may cause some obstruction of vision.     Rescheduling: If you need to reschedule your surgery, please notify the office as soon as possible.

## 2024-11-27 NOTE — TELEPHONE ENCOUNTER
Pre- operative Mohs Telephone Scheduling Note    Do you have a pacemaker, defibrillator, spinal or brain stimulator? no    Do you take antibiotics before skin or dental procedures? no  If yes, will likely require pre-operative antibiotics. Ask  the patient why they take the antibiotics (usually because of joint replacement).    Do you have a history of a joint replacements within the past 2 years? no   If yes, will likely require pre-operative antibiotics. Ask if orthopaedic surgeon has prescribed pre-operative antibiotics to take before procedures/dental work?    Do you take any OTC medications that thin your blood (Aspirin, Aleve, Ibuprofen) or supplements that thin your blood (fish oil, garlic, vitamin E, Ginko Biloba)? yes: asa    Do you take any prescribed medications that thin your blood (Coumadin, Plavix, Xarelto, Eliquis or another prescribed blood thinner)? no    Do you have an allergy to lidocaine or epinephrine? no    Do you have allergies to Iodine? no    Do you wear a lidocaine patch? no    Have you ever been diagnosed with HIV, AIDS, Hep B and Hep C? no    Do you use a cane, walker or wheelchair? no    Is the patient from a nursing home? no If yes, Is there any special accommodations that is needed for patient n/a    Do you smoke? no      If yes,  patient to try and stop 2 days before surgery and 7 days after the surgery. Minimizing smoking as much as possible during this time will improve healing and the cosmetic result after surgery.    Do you use supplemental oxygen? If so, how many liters and can you be off it for a short period of time? N/a    Date scheduled: 1/6/25 at 10 with Dr Mills    Coordination of Care with other provider (Oculoplastics, Plastics, ENT) required? no   IF YES, PLEASE FORWARD TO APPROPRIATE PERSONNEL TO HELP COORDINATE.    Are there remaining tumors to be scheduled? no    Was Prior Authorization obtained? No (please use [email or web address] to document prior auth)

## 2025-01-02 ENCOUNTER — TELEPHONE (OUTPATIENT)
Dept: DERMATOLOGY | Facility: CLINIC | Age: 80
End: 2025-01-02

## 2025-01-06 ENCOUNTER — PROCEDURE VISIT (OUTPATIENT)
Dept: DERMATOLOGY | Facility: CLINIC | Age: 80
End: 2025-01-06
Payer: COMMERCIAL

## 2025-01-06 VITALS
TEMPERATURE: 98.8 F | HEIGHT: 69 IN | SYSTOLIC BLOOD PRESSURE: 150 MMHG | OXYGEN SATURATION: 96 % | DIASTOLIC BLOOD PRESSURE: 72 MMHG | BODY MASS INDEX: 29.33 KG/M2 | HEART RATE: 76 BPM | WEIGHT: 198 LBS

## 2025-01-06 DIAGNOSIS — D04.21 SQUAMOUS CELL CARCINOMA IN SITU (SCCIS) OF SKIN OF HELIX OF RIGHT EAR: ICD-10-CM

## 2025-01-06 PROCEDURE — 17311 MOHS 1 STAGE H/N/HF/G: CPT | Performed by: DERMATOLOGY

## 2025-01-06 PROCEDURE — 13151 CMPLX RPR E/N/E/L 1.1-2.5 CM: CPT | Performed by: DERMATOLOGY

## 2025-01-06 RX ORDER — CHLORHEXIDINE GLUCONATE ORAL RINSE 1.2 MG/ML
SOLUTION DENTAL
COMMUNITY
Start: 2024-10-09

## 2025-01-06 RX ORDER — CYCLOBENZAPRINE HCL 5 MG
TABLET ORAL
COMMUNITY
Start: 2024-12-13

## 2025-01-06 NOTE — PATIENT INSTRUCTIONS
"Mohs Microscopic Surgery After Care    WOUND CARE AFTER SURGERY:    Do NOT to remove the pressure bandage for 48 hours. Keep the area clean and dry while this bandage is on.    After removing the bandage for the first time, gently clean the area with soap and water. If the bandage is difficult to remove, getting the bandage wet in the shower will sometimes help soften the adhesive and allow it to be removed more easily.     You will now need to cleanse this area daily in the shower with gentle soap. There is no need to scrub the area. You will need to apply plain Vaseline ointment (this is over the counter and not a prescription) to the site for up to 2 weeks followed by a clean appropriately sized bandage to area.  Non stick dressing and paper tape (or Hypafix) are recommended for sensitive skin but a bandaid is fine if it covers the area well.    All your stitches will dissolve over the next two weeks. You will need to keep these moist with Vaseline and covered with a bandage over the next 2 weeks for them to dissolve appropriately.    RESTRICTIONS:     For two DAYS:   - You will need to take it very easy as this time is highest risk for bleeding. Being a \"couch potato\" during these two days is generally recommended.   - For surgeries on the face/neck/scalp: Avoid leaning down to pick things up off the floor as this brings blood up to your head. Instead, squat down to pick things up.     For two WEEKS:   - No heavy lifting (anything greater than 10 pounds)   - You can start to do slow, gentle activities such as slow walking but nothing to increase your heart rate and blood pressure too much (such as cardiovascular exercise). It is important to take it easy as there is still a risk for bleeding and a high risk popping of stitches open during this time.     MANAGING YOUR PAIN AFTER SURGERY     You can expect to have some pain after surgery. This is normal. The pain is typically worse the first two days after " surgery, and quickly begins to get better.     The best strategy for controlling your pain after surgery is around the clock pain control. You can take over the counter Acetaminophen (Tylenol) for discomfort, if no contraindications.     If you are taking this at the maximum dose, you can alternate this with Motrin (ibuprofen or Advil) as well. Alternating these medications with each other allows you to maximize your pain control. In addition to Tylenol and Motrin, you can use heating pads or ice packs on your incisions to help reduce your pain.     How will I alternate your regular strength over-the-counter pain medication?  You will take a dose of pain medication every three hours.   Start by taking 650 mg of Tylenol (2 pills of 325 mg)   3 hours later take 600 mg of Motrin (3 pills of 200 mg)   3 hours after taking the Motrin take 650 mg of Tylenol   3 hours after that take 600 mg of Motrin.    See example - if your first dose of Tylenol is at 12:00 PM     12:00 PM  Tylenol 650 mg (2 pills of 325 mg)    3:00 PM  Motrin 600 mg (3 pills of 200 mg)    6:00 PM  Tylenol 650 mg (2 pills of 325 mg)    9:00 PM  Motrin 600 mg (3 pills of 200 mg)    Continue alternating every 3 hours      Important:   Do not take more than 4000mg of Tylenol or 3200mg of Motrin in a 24-hour period.     What if I still have pain?   If you have pain that is not controlled with the over-the-counter pain medications (Tylenol and Motrin or Advil), don't hesitate to call our staff using the number provided. We will help make sure you are managing your pain in the best way possible, and if necessary, we can provide a prescription for additional pain medication.       CALL OUR OFFICE IMMEDIATELY FOR ANY SIGNS OF INFECTION:    This includes fever, chills, increased redness, warmth, tenderness, severe discomfort/pain, or pus or foul smell coming from the wound. If you are experiencing any of the above, please call [facility]'s Mohs Department directly  at [phone].    IF BLEEDING IS NOTICED:    Place a clean cloth over the area and apply firm pressure for thirty minutes.  Check the wound ONLY after 30 minutes of direct pressure; do not cheat and sneak a peak, as that does not count.  If bleeding persists after 30 minutes of legitimate direct pressure, then try one more round of direct pressure to the area.  Should the bleeding become heavier or not stop after the second attempt, call [facility] directly at [phone]. Your call will get routed to the dermatology surgeon on call even after hours.

## 2025-01-06 NOTE — PROGRESS NOTES
MOHS Procedure Note    Patient: Joby Gill  : 1945  MRN: [id number]  Date: 2025    History of Present Illness: The patient is a 79 y.o. 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     Past Medical History:   Diagnosis Date    Eczema     Squamous cell skin cancer 2024    right helix, mohs       Past Surgical History:   Procedure Laterality Date    MOHS SURGERY Right 2025    SCCIS (at least) right helix, Dr Mills         Current Outpatient Medications:     amLODIPine (NORVASC) 5 mg tablet, Take 5 mg by mouth daily, Disp: , Rfl:     betamethasone dipropionate (DIPROSONE) 0.05 % cream, Apply BID for up to 2 weeks to affected skin on neck down prn flares then take one week break and can repeat regimen prn flares. Do not apply to groin, skin folds, face., Disp: 45 g, Rfl: 2    chlorhexidine (PERIDEX) 0.12 % solution, RINSE WITH 1/2 OUNCE TWICE A DAY AS DIRECTED FOR 7 DAYS. DO NOT SWALLOW, Disp: , Rfl:     cyclobenzaprine (FLEXERIL) 5 mg tablet, TAKE 1 TABLET ORALLY TWICE A DAY AS NEEDED FOR MUSCLE SPASM, Disp: , Rfl:     Dupilumab (Dupixent) 300 MG/2ML SOPN, Inject 300mg on week 6 then every other week there after for maintenance dose., Disp: 4 mL, Rfl: 10    Dupilumab (Dupixent) 300 MG/2ML SOPN, Inject 600mg on week 0 then 300mg on week 2 and 4 for loading dose., Disp: 8 mL, Rfl: 0    hydrochlorothiazide (HYDRODIURIL) 25 mg tablet, Take 25 mg by mouth in the morning, Disp: , Rfl:     montelukast (SINGULAIR) 10 mg tablet, Take 10 mg by mouth in the morning, Disp: , Rfl:     pravastatin (PRAVACHOL) 40 mg tablet, Take 40 mg by mouth in the morning, Disp: , Rfl:     triamcinolone (KENALOG) 0.1 % cream, Apply 1 application. topically 2 (two) times a day (Patient not taking: Reported on 3/6/2023), Disp: , Rfl:     Allergies   Allergen Reactions    Penicillins Anaphylaxis, Hives and Shortness Of Breath       Physical Exam:   Vitals:    25 0945   BP:  150/72   Pulse: 76   Temp: 98.8 °F (37.1 °C)   SpO2: 96%     General: Awake, Alert, Oriented x 3, Mood and affect appropriate  Respiratory: Respirations even and unlabored  Cardiovascular: Peripheral pulses intact; no edema  Musculoskeletal Exam: n/a    Skin: 1.2 cm x 0.5 cm pink macule    Assessment: Biopsy confirmed squamous cell carcinoma at least in situ of the right helix.     Plan: Mohs    Time of H&P Completion:0940    MOHS Procedure Timeout      Flowsheet Row Most Recent Value   Timeout: 0954   Patient Identity Verified: Yes   Correct Site Verified: Yes   Correct Procedure Verified: Yes            MOHS Diagnosis/Indication/Location/ID      Flowsheet Row Most Recent Value   Pathology Type Squamous cell carcinoma   Anatomic Site --  [right helix]   Indications for MOHS tumor location   MOHS ID [id number]            MOHS Site/Accession/Pre-Post      Flowsheet Row Most Recent Value   Original Site Identified (as submitted by referring clinician) Photo, Referral   Biopsy Accession/Specimen # (as submitted by referring clincian) [id number]   Pre-MOHS Size Length (cm) 1.2   Pre-MOHS Size Width (cm) 0.5   Post-MOHS Size-Length (cm) 1.5   Post MOHS Size-Width (cm) 0.6   Repair Type Complex layered closure   Suture Type Vicryl, Fast absorbing gut   Fast Absorbing Suture Size 5   Vicryl Suture Size 5   Final repair length (cm): 2.2   Anesthetic Used 1% Lidocaine with epinephrine  [1.5 mL]            MOHS Tumor Stage 1 Information      Flowsheet Row Most Recent Value   Tissue Sections (blocks) 2   Microscopic Exam Section 1: No tumor identified in section.   Microscopic Exam Section 2: No tumor identified in section.   Tumor Clear After Stage I? Yes                        Patient identified, procedure verified, site identified and verified. Time out completed. Surgical removal of the lesion discussed with the patient (risks and benefits, including possibility of scarring, infection, recurrence or potential for further  treatment)  I have specifically identified the site with the patient. I have discussed the fact that the patient will have a scar after the procedure regardless of granulation or repair with sutures. I have discussed that the repair options can range from granulation in some cases to linear or curvilinear closures to larger flaps or grafts.  There are sometimes flaps or grafts used that require multiples stages of surgery and will not be completed today, rather be completed over a series of appointments. I have discussed that occasionally due to location, size or depth of the lesion I may recommend consultation with and transfer of care for further removal or the reconstruction to another provider such as ophthalmology surgery, plastic surgery, ENT surgery, or surgical oncology. There are cases in which other testing such as imaging with MRI or CT scan or testing of lymph nodes is recommended because of the nature/depth/location of tumor seen during the removal. There is a risk of injury to nerves causing temporary or permanent numbness or the inability to move muscles full such as the inability to lift eyebrows. Questions answered and verbal and written consent was obtained.    The tumor qualifies for Mohs based on AUC criteria. Dr. Mills served as the surgeon and pathologist during the procedure.    With the patient in the supine position and under adequate local anesthesia with 1% lidocaine with epinephrine 1:100,000, the defect was scrubbed with Chlorhexidine. Sterile drapes were placed from the sterile tray.  Because of the location of the surgical defect, a complex closure was judged to give the best possible cosmetic and functional result.  The edges of the defect were carefully debrided removing any dead or coagulated tissue.      This was a complex closure because of the following:     There was involvement of the free margin of the helical rim    Hemostasis was obtained by pinpoint electrocoagulation.   Careful planning of removal of redundant tissue at either end of the defect was drawn out so that the suture lines would fall in the optimal orientation with regard to the relaxed skin tension lines.  These were then removed with a #15 blade scalpel.  The wound was then approximated by a deep layer of buried vertical mattress sutures and the cutaneous margins were approximated and closed by superficial sutures as noted above.   Estimated blood loss was less than 5 mL.      The patient tolerated the procedure well.  The wound was dressed with petrolatum, a non-stick pad, and a compression dressing.     Aquiles Mills MD served as the surgeon and pathologist during the procedure.    Postoperative care: Wound care discussed at length.  I urged the patient to call us if any problems or question should arise.     Complications: none  Post-op medications: none  Patient condition after procedure: stable  Discharge plans: Plan for follow up as planned with general dermatologist for skin checks or sooner if needed for healing surgical site.     SCC cleared with 1 stage of mohs and repaired with 2.2cm complex closure.  Well tolerated.  S/R not needed as all dissolving sutures used.    Scribe Attestation      I,:  Sofiya Dougherty MA am acting as a scribe while in the presence of the attending physician.:       I,:  Aquiles Mills MD personally performed the services described in this documentation    as scribed in my presence.:

## 2025-01-28 ENCOUNTER — OFFICE VISIT (OUTPATIENT)
Dept: DERMATOLOGY | Facility: CLINIC | Age: 80
End: 2025-01-28
Payer: COMMERCIAL

## 2025-01-28 DIAGNOSIS — L30.9 ECZEMA, UNSPECIFIED TYPE: Primary | ICD-10-CM

## 2025-01-28 DIAGNOSIS — Z11.59 SCREENING FOR VIRAL DISEASE: ICD-10-CM

## 2025-01-28 DIAGNOSIS — Z13.6 ENCOUNTER FOR SCREENING FOR CARDIOVASCULAR DISORDERS: ICD-10-CM

## 2025-01-28 DIAGNOSIS — L29.9 PRURITUS, UNSPECIFIED: ICD-10-CM

## 2025-01-28 DIAGNOSIS — L30.9 DERMATITIS: ICD-10-CM

## 2025-01-28 PROCEDURE — 99214 OFFICE O/P EST MOD 30 MIN: CPT

## 2025-01-28 RX ORDER — UPADACITINIB 15 MG/1
TABLET, EXTENDED RELEASE ORAL
Status: CANCELLED | OUTPATIENT
Start: 2025-01-28

## 2025-01-28 RX ORDER — BETAMETHASONE DIPROPIONATE 0.5 MG/G
CREAM TOPICAL
Qty: 45 G | Refills: 2 | Status: SHIPPED | OUTPATIENT
Start: 2025-01-28

## 2025-01-28 NOTE — PROGRESS NOTES
"[facility] Dermatology Clinic Note     Patient Name: Joby Gill  Encounter Date: 1/28/2025     Have you been cared for by a [facility] Dermatologist in the last 3 years and, if so, which description applies to you?    Yes.  I have been here within the last 3 years, and my medical history has NOT changed since that time.  I am MALE/not capable of bearing children.    REVIEW OF SYSTEMS:  Have you recently had or currently have any of the following? No changes in my recent health.   PAST MEDICAL HISTORY:  Have you personally ever had or currently have any of the following?  If \"YES,\" then please provide more detail. No changes in my medical history.   HISTORY OF IMMUNOSUPPRESSION: Do you have a history of any of the following:  Systemic Immunosuppression such as Diabetes, Biologic or Immunotherapy, Chemotherapy, Organ Transplantation, Bone Marrow Transplantation or Prednisone?  No     Answering \"YES\" requires the addition of the dotphrase \"IMMUNOSUPPRESSED\" as the first diagnosis of the patient's visit.   FAMILY HISTORY:  Any \"first degree relatives\" (parent, brother, sister, or child) with the following?    No changes in my family's known health.   PATIENT EXPERIENCE:    Do you want the Dermatologist to perform a COMPLETE skin exam today including a clinical examination under the \"bra and underwear\" areas?  NO  If necessary, do we have your permission to call and leave a detailed message on your Preferred Phone number that includes your specific medical information?  Yes      Allergies   Allergen Reactions    Penicillins Anaphylaxis, Hives and Shortness Of Breath      Current Outpatient Medications:     amLODIPine (NORVASC) 5 mg tablet, Take 5 mg by mouth daily, Disp: , Rfl:     betamethasone dipropionate (DIPROSONE) 0.05 % cream, Apply BID for up to 2 weeks to affected skin on neck down prn flares then take one week break and can repeat regimen prn flares. Do not apply to groin, skin folds, face., Disp: 45 g, " Rfl: 2    hydrochlorothiazide (HYDRODIURIL) 25 mg tablet, Take 25 mg by mouth in the morning, Disp: , Rfl:     montelukast (SINGULAIR) 10 mg tablet, Take 10 mg by mouth in the morning, Disp: , Rfl:     pravastatin (PRAVACHOL) 40 mg tablet, Take 40 mg by mouth in the morning, Disp: , Rfl:     chlorhexidine (PERIDEX) 0.12 % solution, RINSE WITH 1/2 OUNCE TWICE A DAY AS DIRECTED FOR 7 DAYS. DO NOT SWALLOW, Disp: , Rfl:     cyclobenzaprine (FLEXERIL) 5 mg tablet, TAKE 1 TABLET ORALLY TWICE A DAY AS NEEDED FOR MUSCLE SPASM, Disp: , Rfl:     Dupilumab (Dupixent) 300 MG/2ML SOPN, Inject 300mg on week 6 then every other week there after for maintenance dose. (Patient not taking: Reported on 1/28/2025), Disp: 4 mL, Rfl: 10    Dupilumab (Dupixent) 300 MG/2ML SOPN, Inject 600mg on week 0 then 300mg on week 2 and 4 for loading dose. (Patient not taking: Reported on 1/28/2025), Disp: 8 mL, Rfl: 0    triamcinolone (KENALOG) 0.1 % cream, Apply 1 application. topically 2 (two) times a day (Patient not taking: Reported on 3/6/2023), Disp: , Rfl:         Whom besides the patient is providing clinical information about today's encounter?   NO ADDITIONAL HISTORIAN (patient alone provided history)    Physical Exam and Assessment/Plan by Diagnosis:    ATOPIC DERMATITIS    Physical Exam:  Anatomic Location Affected:  chest, back, bilateral arms, bilateral legs  Morphological Description:  excoriated scaly erythematous papules and plaques   Body Surface Area Today:  30%  Overall Severity: severe  Pertinent Positives:  Pertinent Negatives:    Additional History of Present Condition:  patient presents for follow up of eczema. Last seen by Dr. Melgar on 3/6/2023. Patient was prescribed Dupixent but never started it as he notes it was too expensive $2000). He spoke with someone over the phone about dupixent cost but did not fill out dupixent myway form. Patient reports his PCP gave him betamethasone but will not refill it. He notes he applies  it twice a day every day, but does not take any breaks. Patient reports he is currently applying for [facility] benefits, however he hasn't done so yet. He will let us know if he is seen by the [facility] before his next follow up.    Assessment and Plan:  Based on a thorough discussion of this condition and the management approach to it (including a comprehensive discussion of the known risks, side effects and potential benefits of treatment), the patient (family) agrees to implement the following specific plan:  Continue betamethasone 0.05% cream BID for 2-3 weeks at a time for flares on the neck down avoiding armpits and groin. Take a week break and repeat as needed for flares.  Moisturize with unscented cream based moisturizer such as CeraVe Cetaphil or Vanicream 2-3 times daily  Take short, lukewarm showers  Use sensitive skin soaps and detergents  We discussed Rinvoq as an option given cost of dupixent -addendum: I spoke with patient 26 and expressed that I would prefer to try and get dupixent covered as opposed to starting Rinvoq given it is only meant for cases refractory to biologics and a biologic has not been tried. We will fill out dupixent myway form and have patient stop by office to fill in patient portion to fax to dupixent to help with cost if possible (currently quoted $2000). Patient also reports he sent in his paperwork for the [facility] and will see about establishing care with them to try getting dupixent through [facility] if possible. Prior authorization request will need to be renewed as well given renewal  2024. Message sent to medication prior auth team     Assessment and Plan:   Atopic Dermatitis is a chronic, itchy skin condition that is very common in children but may occur at any age. It is also known as “eczema” or “atopic eczema.” It is the most common form of dermatitis.    Atopic dermatitis usually occurs in people who have an “atopic tendency.”  This means they may develop any or all of these  "closely linked conditions:  Atopic dermatitis, asthma, hay fever (allergic rhinitis), eosinophilic esophagitis, and gastroenteritis.  Often these conditions run within families with a parent, child or sibling also affected. A family history of asthma, eczema or hay fever is particularly useful in diagnosing atopic dermatitis in infants.    Atopic dermatitis arises because of a complex interaction of genetic and environmental factors. These include defects in skin barrier function making the skin more susceptible to irritation by soap and other contact irritants, the weather, temperature and non-specific triggers.  There is also an element of immune system dysregulation that is often present.  By definition, it is chronic and has a \"waxing-waning\" nature; flares should be expected but with good education and treatment strategies can be minimized.    Some specific tips we discussed:  Dry skin care.  Using only mild cleansers (hypoallergenic and without fragrances) and fragrance free detergent (not “unscented” products which contain a masking agent); we discussed avoiding irritants/fragranced products.  The importance of regular application of moisturizers daily (at least 3 times a day)  The known and theoretical side effects of steroids at length, including but not limited to atrophy of skin and increased pressure in eye (glaucoma) and clouding of the eye's lens (cataracts) if used in or around the eye for extended durations.  The specific over-the-counter interventions and medications.  Side effects, risks and benefits of topical and oral medications discussed.  After lengthy discussion of etiology and treatment options, we decided to implement the following personalized treatment plan:      EDUCATION AS INTERVENTION!    WHAT IS ATOPIC DERMATITIS?  Atopic dermatitis (also called “eczema”) is a condition of the skin where the skin is dry, red, and itchy.  The main function of the skin is to provide a barrier from the " environment and is also the first defense of the immune system.    In atopic dermatitis the skin barrier is decreased or disrupted, and the skin is easily irritated.  As a result, moisture escapes the skin more easily, and environmental allergens and microbes can enter the skin more easily.  Consequently, the skin's immune system is altered.  If there are increased allergic type cells in the skin, the skin may become red and “hyper-excitable.” This leads to itching and a subsequent rash.    WHY DO PEOPLE GET ATOPIC DERMATITIS?  There is no single answer because many factors are involved.  It is likely a combination of genetic makeup and environmental triggers and/or exposures. Excessive drying or sweating of the skin, Irritating soaps, dust mites, and pet dander are some of the more common triggers.  There is no blood test that can be done to confirm this diagnosis. The history and appearance of the skin is usually sufficient for a diagnosis. However, in some cases if the rash does not fit with the history or respond appropriately to treatment, a skin biopsy may be helpful.  Many children do outgrow atopic dermatitis or get better; however, many continue to have sensitive skin into adulthood.  Asthma and hay fever are often seen in many patients with atopic dermatitis; however, asthma flares do not necessarily occur at the same time as skin flares.     PREVENTING FLARES OF ATOPIC DERMATITIS  The first step is to maintain the skin's barrier function.  Keep the skin well moisturized.  Avoid irritants and triggers.  Use prescribed medicine when there are red or rough areas to help the skin to return to normal as quickly as possible.  Try to limit scratching.     If you keep the skin well moisturized, and avoid coming in contact with things you know irritate your child's skin, there will be less flares.  However, some flares of atopic dermatitis are beyond your control.  You should work with your health care provider to  come up with a plan that minimizes flares while minimizing long term use of medications that suppress the immune system.    WHAT ARE SOME OF THE TRIGGERS?  Triggers are different for different people. The most common triggers are:  Heat and sweat for some individuals, cold weather for others.  House dust mites, pet fur.  Wool; synthetic fabrics like nylon; dyed fabrics.  Tobacco smoke   Fragrances in: shampoos, soaps, lotions, laundry detergents, fabric softeners.  Saliva or prolonged exposure to water.    WHAT ABOUT FOOD ALLERGIES?  This is a very controversial topic, as many believe that food allergies are responsible for skin flares. In some cases, specific foods may cause worsening of atopic dermatitis; however this occurs in a minority of cases and usually happens within a few hours of ingestion. While food allergy is more common in children with eczema, foods are specific triggers for flares in only a small percentage of children.  If you notice that the skin flares after certain foods you can see if eliminating one food at time makes a difference, as long as your child can still enjoy a well-balanced diet.   There are blood (RAST) and skin (PRICK) tests that can check for allergies, but they are often positive in children who are not truly allergic. Therefore it is important that you work with your allergist and dermatologist to determine which foods are relevant and causing true symptoms.  Extreme food elimination diets without the guidance of your doctor, which have become more popular in recent years, may even result in worsening of the skin rash due to malnutrition and avoidance of essential nutrients.    TREATMENT  Treatments are aimed at minimizing exposure to irritating factors and decreasing  the skin inflammation which results in an itchy rash.There are many different treatment options, which depend on your child's rash, its location, and severity.  Topical treatments include corticosteroids and  steroid-like creams such as Protopic, Elidel, and Eucrisa, which are believed to not thin the skin.  Please read the discussions below regarding risks and benefits of all of these creams.    Occasionally bacterial or viral infections can occur which flare the skin and require oral and/or topical antibiotics or antivirals. In some cases bleach baths 2-3 times weekly can be helpful to prevent recurrent infection.    For severe disease, strong oral medications such as corticosteroids, methotrexate or azathioprine (Imuran) may be needed. These medications require close monitoring and follow-up. You should discuss the risks/ benefits/alternatives of these medications with your health care provider to come up with the best treatment plan for your child.    1) Use moisturizer all over the entire body at least THREE TIMES a day.  This keeps the skin moisturized to restore the barrier function.  Find a cream or ointment that your child likes - this is the most important.  The medicines do not work in the bottle.  The thicker the moisturizer, generally the better barrier it provides.  Ointments often moisturize better than creams; and creams work better than lotions.  Lotions are more useful during the summer when thick greasy ointments are uncomfortable.  If you put moisturizer on the skin after bathing, while the skin is damp, it is twice as effective.  The moisturizer provides a seal holding the water in the skin.  You may bathe your child in warm - not hot - water, for short periods of time (no more than 5-10 minutes at a time) once a day if they like.  Lightly pat your child dry with a towel and, while the skin is still damp, (within 3 minutes) apply a moisturizer from head to toe.  If your child is using a medicated cream, apply it and allow it to absorb completely BEFORE you apply the moisturizer.    2) Apply the prescription medication TWICE A DAY to only the red, rough areas on the skin OR AS DIRECTED BY YOUR HEALTH  CARE PROVIDER  Put the medication on your fingers and gently rub it into the areas.  Usually the medicine will help an area within a few days time.  Try to put the medicine on for two days after you have noticed that the redness is no longer present; this will help the redness from returning.  The severity of the rash and the strength and usage of the medication will determine how quickly you see improvement.    It is important that you do not overuse steroid creams, and if you notice a thin, shiny appearance to the skin or broken blood vessels, you should stop using the cream and consult your health care provider regarding possible overuse/overthinning of the skin.  The face, armpits and groin have particularly thin and sensitive skin and are therefore most at risk for bad results if steroids are over-used in these sites.      3) Avoid triggers.    Some children have specific things that trigger itching and rashes, while others may have none that can be identified.  It may require a little bit of trial and error to see what applies to your child.  Also, triggers can change over time for your child. The most common triggers are listed above; start with these.  Avoid the use of fabric softeners in the washing machine or dryer sheets (unless they are fragrance-free).  Try to use laundry detergents, soaps and shampoos that are fragrance-free. You may find it helpful to double-rinse your clothes.  Some children are sensitive to house dust mites and they may benefit from a plastic mattress wrap.  While food allergy is more common in children with eczema, foods are specific triggers for flares in only a small percentage of children.  If you notice that the skin flares after certain foods you can see if eliminating one food at time makes a difference, as long as your child can still enjoy a well-balanced diet.     4) Consider using a medication like an anti-histamine by mouth to help control the itching.    Scratching only  "makes the skin more reactive and the barrier function even more disrupted.  It can cause both children and their parents to lose sleep!  There are different types of anti-itch medications.  Some cause more drowsiness than others.  Both types are acceptable depending on your child and your preference.  Start with Benadryl and if that does not work, ask for a prescription “antihistamine.”    5) About the prescription creams:  Corticosteroid creams and ointments (generally things with \"-one\" or \"luis\" on the end of their names):  The strength of the cream or ointment depends on the name of the active ingredient.  The numbers at the end do not indicate the relative strength.  Thus triamcinolone 0.1% ointment, considered a mid-strength corticosteroid, is much stronger than hydrocortisone 1% even though the number following the name is much lower.  Topical corticosteroids are very effective in treating atopic dermatitis.  When used in the manner prescribed (to rashy areas of skin and for no more than a few weeks at a time to any one area) they are very safe.  These are corticosteroids and are anti-inflammatory, not the “anabolic steroids” like those used illegally by some athletes.     Topical non-steroid creams and ointments (immunomodulators):  These creams and ointments are also called topical calcineurin inhibitors (TCIs).  These include Protopic ointment and Elidel cream. Crisaborole 2% (Eucrisa) is a prescription ointment that targets an enzyme called PDE4 (phosphodiesterase 4).  It is used on the skin topically to treat mild-to-moderate eczema in adults and children 2 years of age and older.  In total, these nonsteroidal prescriptions are used to help decrease itching and redness in the skin.  They are not as strong as most steroid creams; however, it is believed that they do not thin the skin when overused.  They are generally used as second-line medications, though they may be used alone or in conjunction with " topical steroids.  In sensitive areas such as the face, underarms or groin, they are often recommended.  They can sting inflamed skin, but are generally well tolerated once the skin is healing.    The FDA placed a “black-box” warning on both Elidel and Protopic in 2006 based on animal studies using the medications.  Some animals developed skin cancer and lymphoma.  Subsequently, the FDA released a statement that there is no causal relationship between the two medications and cancer.  Because of this concern, there are ongoing studies to evaluate this relationship in humans.  So far, there are studies that support the safety of these medications.  One showed that the rates of cancer in patient using these medications topically were less than the rates of the general population and another showed that in patient's using the medication over a large area of the body, the levels of the medication in the blood was undetectable.    As for Eucrisa, this product is only approved for the topical treatment of mild-to-moderate eczema in patients 2 years of age and older; use of the medication in kids younger than 2 is considered “off label” and has not been formally studied.  Burning and stinging are the most commonly reported side effects of this medication.  Rarely, this product has been known to cause hives and hypersensitivity reactions; discontinue its use if you develop severe itching, swelling, or redness in the area of application.      Scribe Attestation      I,:  Elicia Reis MA am acting as a scribe while in the presence of the attending physician.:       I,:  Ethel Sapp PA-C personally performed the services described in this documentation    as scribed in my presence.:

## 2025-01-30 ENCOUNTER — TELEPHONE (OUTPATIENT)
Dept: DERMATOLOGY | Facility: CLINIC | Age: 80
End: 2025-01-30

## 2025-01-30 NOTE — TELEPHONE ENCOUNTER
Spoke with patient to review discussion from ov on Tuesday 1/28. I expressed my concerns for initiation of Rinvoq over dupixent and that Rinvoq is meant for refractory eczema unresponsive to biologics which the patient has yet to try. Dupixent was approved but not started due to cost.    We have agreed to fill out dupixent myway form and see about getting lower cost for patient. In the interim we reviewed expected skin care regimen and continued use of betamethasone cream for flares. Patient will come to office to fill out patient portion of dupixent form prior to faxing.    He will also keep office posted on status of his acceptance [facility] be [facility] as he may be able to get dupixent through [facility] as well if cost is still too high.

## 2025-02-10 ENCOUNTER — TELEPHONE (OUTPATIENT)
Age: 80
End: 2025-02-10

## 2025-02-10 NOTE — TELEPHONE ENCOUNTER
PA for Dupixent 300mg pen SUBMITTED to [facility] R/x     via    [x]CMM-KEY: [id number]  []Surescripts-Case ID #   []Availity-Auth ID # NDC #   []Faxed to plan   []Other website   []Phone call Case ID #     [x]PA sent as URGENT    All office notes, labs and other pertaining documents and studies sent. Clinical questions answered. Awaiting determination from insurance company.     Turnaround time for your insurance to make a decision on your Prior Authorization can take 7-21 business days.

## 2025-02-10 NOTE — TELEPHONE ENCOUNTER
----- Message from Ethel Sapp PA-C sent at 2025  8:13 AM EST -----  Regarding: Dupixent PA  Hi team- I can't find that I sent a message about this patient already but could we try and get an urgent PA done for dupixent for this patient. He was approved once before but for financial reasons did not start it and now the approval has . I am going to work on dupixent myway form for him to see if we can get cost down.    Dosing : Inject 600mg on week 0 then 300mg on week 2 and every other week thereafter

## 2025-02-11 NOTE — TELEPHONE ENCOUNTER
Call received from [employer] to complete some questions .   1.- is dupixent prescribed by a dermatologist , allergist or immunologist ? Dermatologist  2.- has the patient tried eucrisa o tacrolimus ? Per notes Has used triamcinolone betamethasone , clobetasol .   3.Is the patient using any additional biologics ? no  All questions answered , we will receive a letter soon

## 2025-02-14 NOTE — TELEPHONE ENCOUNTER
PA for Dupixent 300mg pen  DENIED    Reason:(Screenshot if applicable)        Message sent to office clinical pool Yes    Denial letter scanned into Media Yes    Appeal started No (Provider will need to decide if appeal is warranted and send clinical documentation to Prior Authorization Team for initiation.)    **Please follow up with your patient regarding denial and next steps**

## 2025-04-08 DIAGNOSIS — L20.9 ATOPIC DERMATITIS, UNSPECIFIED TYPE: Primary | ICD-10-CM

## 2025-04-08 DIAGNOSIS — L30.9 ECZEMA, UNSPECIFIED TYPE: ICD-10-CM

## 2025-04-09 ENCOUNTER — TELEPHONE (OUTPATIENT)
Age: 80
End: 2025-04-09

## 2025-04-09 DIAGNOSIS — L30.9 ECZEMA, UNSPECIFIED TYPE: ICD-10-CM

## 2025-04-09 DIAGNOSIS — L20.9 ATOPIC DERMATITIS, UNSPECIFIED TYPE: Primary | ICD-10-CM

## 2025-04-09 NOTE — PROGRESS NOTES
Call received from [facility] requesting a clarification on dupixent . They need a loading dose script and the quantity and number of  refills for the maintenance dose needs to be changed .   Per pharmacist the number of refills needs to be 11 for the  whole year instead of 26.   For additional questions please reach out to [phone]

## 2025-04-09 NOTE — TELEPHONE ENCOUNTER
Call received from [facility] requesting a clarification on dupixent . They need a loading dose script and the quantity and number of  refills for the maintenance dose needs to be changed .   Per pharmacist the number of refills needs to be 11 for the  whole year instead of 26.   For additional questions please reach out to [phone]

## 2025-07-07 ENCOUNTER — OFFICE VISIT (OUTPATIENT)
Dept: URGENT CARE | Facility: CLINIC | Age: 80
End: 2025-07-07
Payer: COMMERCIAL

## 2025-07-07 VITALS
OXYGEN SATURATION: 96 % | SYSTOLIC BLOOD PRESSURE: 177 MMHG | HEART RATE: 71 BPM | RESPIRATION RATE: 18 BRPM | TEMPERATURE: 97.6 F | DIASTOLIC BLOOD PRESSURE: 83 MMHG

## 2025-07-07 DIAGNOSIS — R22.0 LEFT FACIAL SWELLING: Primary | ICD-10-CM

## 2025-07-07 PROCEDURE — 99203 OFFICE O/P NEW LOW 30 MIN: CPT

## 2025-07-07 NOTE — PROGRESS NOTES
[facility]  Name: Joby Gill      : 1945      MRN: [id number]  Encounter Provider: VICTOR HUGO Alvarez  Encounter Date: 2025   Encounter department: [facility] [place]  :  Assessment & Plan  Left facial swelling         Concern for sialadenitis, specifically parotitis vs abscess  Patient agreeable to seek further evaluation in the ED  Spouse plans to take him to [place] ED now via private vehicle      Patient Instructions    You are to go to the ED for further evaluation of your symptoms.    Follow-up with your PCP after the ED.     If tests are performed, our office will contact you with results only if changes need to made to the care plan discussed with you at the visit. You can review your full results on [facility].    Chief Complaint:   Chief Complaint   Patient presents with    Facial Swelling     Pt has facial swelling and throbbing on the left side that started this morning.     History of Present Illness   79-year-old male who presents for an evaluation of left sided facial swelling with abrupt onset this morning. Patient states the area is painful and it is uncomfortable for him to eat. He denies difficulty breathing or swallowing. No fevers/chills or headaches. No recent illnesses.       History obtained from: patient    Review of Systems   Constitutional:  Negative for chills and fever.   HENT:  Positive for facial swelling. Negative for dental problem, drooling, ear pain and trouble swallowing.    Respiratory:  Negative for shortness of breath.    Cardiovascular:  Negative for chest pain.   Musculoskeletal:  Negative for neck pain and neck stiffness.   Neurological:  Negative for dizziness, light-headedness and headaches.     Past Medical History   Past Medical History[1]  Past Surgical History[2]  Family History[3]  he reports that he has never smoked. He uses smokeless tobacco. He reports current alcohol use of about 1.0 standard drink of  alcohol per week.  Current Outpatient Medications   Medication Instructions    amLODIPine (NORVASC) 5 mg, Daily    betamethasone dipropionate (DIPROSONE) 0.05 % cream Apply BID for up to 2 weeks to affected skin on neck down prn flares then take one week break and can repeat regimen prn flares. Do not apply to groin, skin folds, face.    chlorhexidine (PERIDEX) 0.12 % solution RINSE WITH 1/2 OUNCE TWICE A DAY AS DIRECTED FOR 7 DAYS. DO NOT SWALLOW    cyclobenzaprine (FLEXERIL) 5 mg tablet TAKE 1 TABLET ORALLY TWICE A DAY AS NEEDED FOR MUSCLE SPASM    Dupilumab (Dupixent) 300 MG/2ML SOPN Inject 300mg on week 6 then every other week there after for maintenance dose.    Dupilumab (Dupixent) 300 MG/2ML SOPN Inject 600mg on week 0 then 300mg on week 2 and 4 for loading dose.    dupilumab (DUPIXENT) subcutaneous injection MAINTENANCE DOSE: inject 300 mg once every 2 weeks.    dupilumab (DUPIXENT) subcutaneous injection LOADING DOSE: inject 600 mg once to start. BEGIN maintenance dose in 2 weeks.    dupilumab (DUPIXENT) 300 mg, Subcutaneous, Every 14 days, MAINTENANCE DOSE: inject 300 mg once every 2 weeks.    hydroCHLOROthiazide 25 mg, Daily    montelukast (SINGULAIR) 10 mg, Daily    pravastatin (PRAVACHOL) 40 mg, Daily    triamcinolone (KENALOG) 0.1 % cream 2 times daily   Allergies[4]     Objective   BP (!) 177/83   Pulse 71   Temp 97.6 °F (36.4 °C)   Resp 18   SpO2 96%      Physical Exam  Vitals and nursing note reviewed.   Constitutional:       General: He is not in acute distress.     Appearance: He is not ill-appearing.   HENT:      Head: Normocephalic and atraumatic.      Jaw: Tenderness, swelling and pain on movement present. No trismus.        Comments: Area of firm swelling with mild erythema/warmth. Area is TTP. No fluctuance. No streaking.      Right Ear: Tympanic membrane, ear canal and external ear normal.      Left Ear: Tympanic membrane, ear canal and external ear normal.      Nose: Nose normal.       "Mouth/Throat:      Mouth: Mucous membranes are moist.      Pharynx: Oropharynx is clear. Uvula midline. No pharyngeal swelling.     Eyes:      Conjunctiva/sclera: Conjunctivae normal.       Cardiovascular:      Rate and Rhythm: Normal rate.   Pulmonary:      Effort: Pulmonary effort is normal.     Musculoskeletal:         General: Normal range of motion.      Cervical back: Normal range of motion and neck supple.     Skin:     General: Skin is warm and dry.     Neurological:      Mental Status: He is alert and oriented to person, place, and time.         Portions of the record may have been created with voice recognition software.  Occasional wrong word or \"sound a like\" substitutions may have occurred due to the inherent limitations of voice recognition software.  Read the chart carefully and recognize, using context, where substitutions have occurred.       [1]   Past Medical History:  Diagnosis Date    Eczema     Squamous cell skin cancer 11/18/2024    right helix, mohs   [2]   Past Surgical History:  Procedure Laterality Date    MOHS SURGERY Right 01/06/2025    SCCIS (at least) right Dr Gene justin   [3]   Family History  Problem Relation Name Age of Onset    Diabetes Brother Matias     Arthritis Brother Min    [4]   Allergies  Allergen Reactions    Penicillins Anaphylaxis, Hives and Shortness Of Breath     " No abnormalities noted